# Patient Record
Sex: MALE | HISPANIC OR LATINO | Employment: UNEMPLOYED | URBAN - METROPOLITAN AREA
[De-identification: names, ages, dates, MRNs, and addresses within clinical notes are randomized per-mention and may not be internally consistent; named-entity substitution may affect disease eponyms.]

---

## 2017-10-16 ENCOUNTER — HOSPITAL ENCOUNTER (INPATIENT)
Facility: MEDICAL CENTER | Age: 47
LOS: 2 days | DRG: 392 | End: 2017-10-18
Attending: EMERGENCY MEDICINE | Admitting: INTERNAL MEDICINE

## 2017-10-16 ENCOUNTER — PATIENT OUTREACH (OUTPATIENT)
Dept: HEALTH INFORMATION MANAGEMENT | Facility: OTHER | Age: 47
End: 2017-10-16

## 2017-10-16 ENCOUNTER — RESOLUTE PROFESSIONAL BILLING HOSPITAL PROF FEE (OUTPATIENT)
Dept: HOSPITALIST | Facility: MEDICAL CENTER | Age: 47
End: 2017-10-16

## 2017-10-16 ENCOUNTER — APPOINTMENT (OUTPATIENT)
Dept: RADIOLOGY | Facility: MEDICAL CENTER | Age: 47
DRG: 392 | End: 2017-10-16
Attending: EMERGENCY MEDICINE

## 2017-10-16 DIAGNOSIS — R74.01 TRANSAMINITIS: ICD-10-CM

## 2017-10-16 DIAGNOSIS — R73.9 HYPERGLYCEMIA: ICD-10-CM

## 2017-10-16 DIAGNOSIS — R11.2 NON-INTRACTABLE VOMITING WITH NAUSEA, UNSPECIFIED VOMITING TYPE: ICD-10-CM

## 2017-10-16 DIAGNOSIS — R07.9 CHEST PAIN, UNSPECIFIED TYPE: ICD-10-CM

## 2017-10-16 PROBLEM — E11.9 TYPE 2 DIABETES MELLITUS (HCC): Status: ACTIVE | Noted: 2017-10-16

## 2017-10-16 PROBLEM — K70.10 ALCOHOLIC HEPATITIS: Status: ACTIVE | Noted: 2017-10-16

## 2017-10-16 PROBLEM — F10.20 ALCOHOL DEPENDENCE (HCC): Status: ACTIVE | Noted: 2017-10-16

## 2017-10-16 PROBLEM — K29.20 ALCOHOLIC GASTRITIS: Status: ACTIVE | Noted: 2017-10-16

## 2017-10-16 LAB
ALBUMIN SERPL BCP-MCNC: 4.6 G/DL (ref 3.2–4.9)
ALBUMIN/GLOB SERPL: 1.3 G/DL
ALP SERPL-CCNC: 94 U/L (ref 30–99)
ALT SERPL-CCNC: 60 U/L (ref 2–50)
ANION GAP SERPL CALC-SCNC: 10 MMOL/L (ref 0–11.9)
APTT PPP: 28.6 SEC (ref 24.7–36)
AST SERPL-CCNC: 139 U/L (ref 12–45)
BASOPHILS # BLD AUTO: 0.8 % (ref 0–1.8)
BASOPHILS # BLD: 0.05 K/UL (ref 0–0.12)
BILIRUB SERPL-MCNC: 3.2 MG/DL (ref 0.1–1.5)
BNP SERPL-MCNC: 12 PG/ML (ref 0–100)
BUN SERPL-MCNC: 6 MG/DL (ref 8–22)
CALCIUM SERPL-MCNC: 9.9 MG/DL (ref 8.5–10.5)
CHLORIDE SERPL-SCNC: 95 MMOL/L (ref 96–112)
CO2 SERPL-SCNC: 26 MMOL/L (ref 20–33)
CREAT SERPL-MCNC: 0.7 MG/DL (ref 0.5–1.4)
EKG IMPRESSION: NORMAL
EOSINOPHIL # BLD AUTO: 0.01 K/UL (ref 0–0.51)
EOSINOPHIL NFR BLD: 0.2 % (ref 0–6.9)
ERYTHROCYTE [DISTWIDTH] IN BLOOD BY AUTOMATED COUNT: 38.3 FL (ref 35.9–50)
GFR SERPL CREATININE-BSD FRML MDRD: >60 ML/MIN/1.73 M 2
GLOBULIN SER CALC-MCNC: 3.5 G/DL (ref 1.9–3.5)
GLUCOSE BLD-MCNC: 265 MG/DL (ref 65–99)
GLUCOSE BLD-MCNC: 285 MG/DL (ref 65–99)
GLUCOSE SERPL-MCNC: 240 MG/DL (ref 65–99)
HCT VFR BLD AUTO: 48 % (ref 42–52)
HGB BLD-MCNC: 17.8 G/DL (ref 14–18)
IMM GRANULOCYTES # BLD AUTO: 0.03 K/UL (ref 0–0.11)
IMM GRANULOCYTES NFR BLD AUTO: 0.5 % (ref 0–0.9)
INR PPP: 1.09 (ref 0.87–1.13)
LIPASE SERPL-CCNC: 62 U/L (ref 11–82)
LYMPHOCYTES # BLD AUTO: 1.29 K/UL (ref 1–4.8)
LYMPHOCYTES NFR BLD: 19.8 % (ref 22–41)
MCH RBC QN AUTO: 33.2 PG (ref 27–33)
MCHC RBC AUTO-ENTMCNC: 37.1 G/DL (ref 33.7–35.3)
MCV RBC AUTO: 89.6 FL (ref 81.4–97.8)
MONOCYTES # BLD AUTO: 0.49 K/UL (ref 0–0.85)
MONOCYTES NFR BLD AUTO: 7.5 % (ref 0–13.4)
NEUTROPHILS # BLD AUTO: 4.64 K/UL (ref 1.82–7.42)
NEUTROPHILS NFR BLD: 71.2 % (ref 44–72)
NRBC # BLD AUTO: 0 K/UL
NRBC BLD AUTO-RTO: 0 /100 WBC
PLATELET # BLD AUTO: 54 K/UL (ref 164–446)
PMV BLD AUTO: 12.9 FL (ref 9–12.9)
POTASSIUM SERPL-SCNC: 3.6 MMOL/L (ref 3.6–5.5)
PROT SERPL-MCNC: 8.1 G/DL (ref 6–8.2)
PROTHROMBIN TIME: 14.5 SEC (ref 12–14.6)
RBC # BLD AUTO: 5.36 M/UL (ref 4.7–6.1)
SODIUM SERPL-SCNC: 131 MMOL/L (ref 135–145)
TROPONIN I SERPL-MCNC: <0.01 NG/ML (ref 0–0.04)
WBC # BLD AUTO: 6.5 K/UL (ref 4.8–10.8)

## 2017-10-16 PROCEDURE — 80053 COMPREHEN METABOLIC PANEL: CPT

## 2017-10-16 PROCEDURE — 71010 DX-CHEST-LIMITED (1 VIEW): CPT | Performed by: EMERGENCY MEDICINE

## 2017-10-16 PROCEDURE — 700105 HCHG RX REV CODE 258: Performed by: INTERNAL MEDICINE

## 2017-10-16 PROCEDURE — 93005 ELECTROCARDIOGRAM TRACING: CPT | Performed by: EMERGENCY MEDICINE

## 2017-10-16 PROCEDURE — 700101 HCHG RX REV CODE 250: Performed by: INTERNAL MEDICINE

## 2017-10-16 PROCEDURE — 700102 HCHG RX REV CODE 250 W/ 637 OVERRIDE(OP): Performed by: INTERNAL MEDICINE

## 2017-10-16 PROCEDURE — 700111 HCHG RX REV CODE 636 W/ 250 OVERRIDE (IP): Performed by: INTERNAL MEDICINE

## 2017-10-16 PROCEDURE — A9270 NON-COVERED ITEM OR SERVICE: HCPCS | Performed by: INTERNAL MEDICINE

## 2017-10-16 PROCEDURE — 84484 ASSAY OF TROPONIN QUANT: CPT

## 2017-10-16 PROCEDURE — 83690 ASSAY OF LIPASE: CPT

## 2017-10-16 PROCEDURE — 85610 PROTHROMBIN TIME: CPT

## 2017-10-16 PROCEDURE — 99285 EMERGENCY DEPT VISIT HI MDM: CPT

## 2017-10-16 PROCEDURE — 93005 ELECTROCARDIOGRAM TRACING: CPT

## 2017-10-16 PROCEDURE — 85025 COMPLETE CBC W/AUTO DIFF WBC: CPT

## 2017-10-16 PROCEDURE — 99223 1ST HOSP IP/OBS HIGH 75: CPT | Performed by: INTERNAL MEDICINE

## 2017-10-16 PROCEDURE — 770020 HCHG ROOM/CARE - TELE (206)

## 2017-10-16 PROCEDURE — HZ2ZZZZ DETOXIFICATION SERVICES FOR SUBSTANCE ABUSE TREATMENT: ICD-10-PCS | Performed by: INTERNAL MEDICINE

## 2017-10-16 PROCEDURE — 71010 DX-CHEST-LIMITED (1 VIEW): CPT

## 2017-10-16 PROCEDURE — 700105 HCHG RX REV CODE 258: Performed by: EMERGENCY MEDICINE

## 2017-10-16 PROCEDURE — 85730 THROMBOPLASTIN TIME PARTIAL: CPT

## 2017-10-16 PROCEDURE — 83880 ASSAY OF NATRIURETIC PEPTIDE: CPT

## 2017-10-16 PROCEDURE — 82962 GLUCOSE BLOOD TEST: CPT

## 2017-10-16 RX ORDER — LORAZEPAM 2 MG/ML
0.5 INJECTION INTRAMUSCULAR EVERY 4 HOURS PRN
Status: DISCONTINUED | OUTPATIENT
Start: 2017-10-16 | End: 2017-10-18 | Stop reason: HOSPADM

## 2017-10-16 RX ORDER — OMEPRAZOLE 20 MG/1
20 CAPSULE, DELAYED RELEASE ORAL DAILY
Status: DISCONTINUED | OUTPATIENT
Start: 2017-10-16 | End: 2017-10-18 | Stop reason: HOSPADM

## 2017-10-16 RX ORDER — SODIUM CHLORIDE 9 MG/ML
1000 INJECTION, SOLUTION INTRAVENOUS ONCE
Status: COMPLETED | OUTPATIENT
Start: 2017-10-16 | End: 2017-10-16

## 2017-10-16 RX ORDER — LORAZEPAM 1 MG/1
2 TABLET ORAL
Status: DISCONTINUED | OUTPATIENT
Start: 2017-10-16 | End: 2017-10-18 | Stop reason: HOSPADM

## 2017-10-16 RX ORDER — ASPIRIN 300 MG/1
300 SUPPOSITORY RECTAL DAILY
Status: DISCONTINUED | OUTPATIENT
Start: 2017-10-16 | End: 2017-10-18 | Stop reason: HOSPADM

## 2017-10-16 RX ORDER — LORAZEPAM 1 MG/1
1 TABLET ORAL EVERY 4 HOURS PRN
Status: DISCONTINUED | OUTPATIENT
Start: 2017-10-16 | End: 2017-10-18 | Stop reason: HOSPADM

## 2017-10-16 RX ORDER — ASPIRIN 325 MG
325 TABLET ORAL DAILY
Status: DISCONTINUED | OUTPATIENT
Start: 2017-10-16 | End: 2017-10-18 | Stop reason: HOSPADM

## 2017-10-16 RX ORDER — ASPIRIN 81 MG/1
324 TABLET, CHEWABLE ORAL DAILY
Status: DISCONTINUED | OUTPATIENT
Start: 2017-10-16 | End: 2017-10-18 | Stop reason: HOSPADM

## 2017-10-16 RX ORDER — LORAZEPAM 0.5 MG/1
0.5 TABLET ORAL EVERY 4 HOURS PRN
Status: DISCONTINUED | OUTPATIENT
Start: 2017-10-16 | End: 2017-10-18 | Stop reason: HOSPADM

## 2017-10-16 RX ORDER — POLYETHYLENE GLYCOL 3350 17 G/17G
1 POWDER, FOR SOLUTION ORAL
Status: DISCONTINUED | OUTPATIENT
Start: 2017-10-16 | End: 2017-10-18 | Stop reason: HOSPADM

## 2017-10-16 RX ORDER — LORAZEPAM 2 MG/ML
1.5 INJECTION INTRAMUSCULAR
Status: DISCONTINUED | OUTPATIENT
Start: 2017-10-16 | End: 2017-10-18 | Stop reason: HOSPADM

## 2017-10-16 RX ORDER — THIAMINE MONONITRATE (VIT B1) 100 MG
100 TABLET ORAL DAILY
Status: DISCONTINUED | OUTPATIENT
Start: 2017-10-17 | End: 2017-10-18 | Stop reason: HOSPADM

## 2017-10-16 RX ORDER — AMOXICILLIN 250 MG
2 CAPSULE ORAL 2 TIMES DAILY
Status: DISCONTINUED | OUTPATIENT
Start: 2017-10-16 | End: 2017-10-18 | Stop reason: HOSPADM

## 2017-10-16 RX ORDER — BISACODYL 10 MG
10 SUPPOSITORY, RECTAL RECTAL
Status: DISCONTINUED | OUTPATIENT
Start: 2017-10-16 | End: 2017-10-18 | Stop reason: HOSPADM

## 2017-10-16 RX ORDER — LORAZEPAM 1 MG/1
4 TABLET ORAL
Status: DISCONTINUED | OUTPATIENT
Start: 2017-10-16 | End: 2017-10-18 | Stop reason: HOSPADM

## 2017-10-16 RX ORDER — LORAZEPAM 2 MG/ML
1 INJECTION INTRAMUSCULAR
Status: DISCONTINUED | OUTPATIENT
Start: 2017-10-16 | End: 2017-10-18 | Stop reason: HOSPADM

## 2017-10-16 RX ORDER — FOLIC ACID 1 MG/1
1 TABLET ORAL DAILY
Status: DISCONTINUED | OUTPATIENT
Start: 2017-10-17 | End: 2017-10-18 | Stop reason: HOSPADM

## 2017-10-16 RX ORDER — LORAZEPAM 2 MG/ML
2 INJECTION INTRAMUSCULAR
Status: DISCONTINUED | OUTPATIENT
Start: 2017-10-16 | End: 2017-10-18 | Stop reason: HOSPADM

## 2017-10-16 RX ORDER — HYDRALAZINE HYDROCHLORIDE 20 MG/ML
20 INJECTION INTRAMUSCULAR; INTRAVENOUS EVERY 6 HOURS PRN
Status: DISCONTINUED | OUTPATIENT
Start: 2017-10-16 | End: 2017-10-18 | Stop reason: HOSPADM

## 2017-10-16 RX ORDER — LORAZEPAM 1 MG/1
3 TABLET ORAL
Status: DISCONTINUED | OUTPATIENT
Start: 2017-10-16 | End: 2017-10-18 | Stop reason: HOSPADM

## 2017-10-16 RX ADMIN — HYDRALAZINE HYDROCHLORIDE 20 MG: 20 INJECTION INTRAMUSCULAR; INTRAVENOUS at 21:22

## 2017-10-16 RX ADMIN — POTASSIUM CHLORIDE: 2 INJECTION, SOLUTION, CONCENTRATE INTRAVENOUS at 22:07

## 2017-10-16 RX ADMIN — INSULIN LISPRO 5 UNITS: 100 INJECTION, SOLUTION INTRAVENOUS; SUBCUTANEOUS at 22:30

## 2017-10-16 RX ADMIN — ASPIRIN 325 MG: 325 TABLET, COATED ORAL at 21:23

## 2017-10-16 RX ADMIN — OMEPRAZOLE 20 MG: 20 CAPSULE, DELAYED RELEASE ORAL at 21:23

## 2017-10-16 RX ADMIN — SODIUM CHLORIDE 1000 ML: 9 INJECTION, SOLUTION INTRAVENOUS at 21:24

## 2017-10-16 ASSESSMENT — ENCOUNTER SYMPTOMS
ABDOMINAL PAIN: 0
NAUSEA: 1
FOCAL WEAKNESS: 0
DIZZINESS: 0
EYE PAIN: 0
COUGH: 0
FALLS: 0
INSOMNIA: 0
CHILLS: 0
HEADACHES: 0
EYE REDNESS: 0
SHORTNESS OF BREATH: 0
FEVER: 0
PALPITATIONS: 0
NERVOUS/ANXIOUS: 0
CONSTIPATION: 0
WHEEZING: 0
WEAKNESS: 0
BLOOD IN STOOL: 0
HEMOPTYSIS: 0
TREMORS: 0
MYALGIAS: 0
HEARTBURN: 1
VOMITING: 0
LOSS OF CONSCIOUSNESS: 0
DIARRHEA: 0
SEIZURES: 0

## 2017-10-16 ASSESSMENT — LIFESTYLE VARIABLES
DOES PATIENT WANT TO STOP DRINKING: NO
TREMOR: NO TREMOR
AVERAGE NUMBER OF DAYS PER WEEK YOU HAVE A DRINK CONTAINING ALCOHOL: 4
HAVE PEOPLE ANNOYED YOU BY CRITICIZING YOUR DRINKING: NO
EVER HAD A DRINK FIRST THING IN THE MORNING TO STEADY YOUR NERVES TO GET RID OF A HANGOVER: NO
NAUSEA AND VOMITING: MILD NAUSEA WITH NO VOMITING
VISUAL DISTURBANCES: NOT PRESENT
TOTAL SCORE: VERY MILD ITCHING, PINS AND NEEDLES SENSATION, BURNING OR NUMBNESS
ON A TYPICAL DAY WHEN YOU DRINK ALCOHOL HOW MANY DRINKS DO YOU HAVE: 3
HEADACHE, FULLNESS IN HEAD: NOT PRESENT
DO YOU DRINK ALCOHOL: YES
HOW MANY TIMES IN THE PAST YEAR HAVE YOU HAD 5 OR MORE DRINKS IN A DAY: 300
EVER FELT BAD OR GUILTY ABOUT YOUR DRINKING: YES
CONSUMPTION TOTAL: POSITIVE
AGITATION: NORMAL ACTIVITY
PAROXYSMAL SWEATS: NO SWEAT VISIBLE
TOTAL SCORE: 2
AUDITORY DISTURBANCES: NOT PRESENT
EVER_SMOKED: NEVER
HAVE YOU EVER FELT YOU SHOULD CUT DOWN ON YOUR DRINKING: YES
ORIENTATION AND CLOUDING OF SENSORIUM: ORIENTED AND CAN DO SERIAL ADDITIONS
TOTAL SCORE: 2
ANXIETY: NO ANXIETY (AT EASE)

## 2017-10-16 ASSESSMENT — PATIENT HEALTH QUESTIONNAIRE - PHQ9
SUM OF ALL RESPONSES TO PHQ QUESTIONS 1-9: 0
2. FEELING DOWN, DEPRESSED, IRRITABLE, OR HOPELESS: NOT AT ALL
SUM OF ALL RESPONSES TO PHQ9 QUESTIONS 1 AND 2: 0
1. LITTLE INTEREST OR PLEASURE IN DOING THINGS: NOT AT ALL

## 2017-10-16 NOTE — ED PROVIDER NOTES
"ED Provider Note    CHIEF COMPLAINT  Chief Complaint   Patient presents with   • Chest Pain     CP left sided x 2 days   • N/V   • Cold Symptoms       HPI  Pratik TAVAREZ is a 47 y.o. male who presents For evaluation of chest pain and vomiting.  The patient states that he's had pain of left sided chest over the last 2 days.  He has also had intermittent vomiting.  Patient states he's had slight cough.  Patient does have a history of alcohol and drug abuse.  The patient denies: Fever, chills, hemoptysis, hematemesis, melena hematochezia, abdominal pain, rashes, pain or swelling lower extremities, syncope.  He does state he gets quite dizzy when he stands up.  He indicates he recently ran out of his insulin.  No other acute symptomatology or complaints.    REVIEW OF SYSTEMS  See HPI for further details.  No history of: Thyroid dysfunction, seizures, heart disease, cancer, stroke.  All other systems negative.    PAST MEDICAL HISTORY  Past Medical History:   Diagnosis Date   • Alcohol abuse    • Drug abuse    Diabetes mellitus    FAMILY HISTORY  No family history on file.    SOCIAL HISTORY  Positive for alcohol and drug use;    SURGICAL HISTORY  Past Surgical History:   Procedure Laterality Date   • HERNIA REPAIR         CURRENT MEDICATIONS  See nurses notes    ALLERGIES  Allergies   Allergen Reactions   • Eggs        PHYSICAL EXAM  VITAL SIGNS: /104   Pulse 71   Temp 36.9 °C (98.4 °F)   Resp 16   Ht 1.651 m (5' 5\")   Wt 75.5 kg (166 lb 7.2 oz)   SpO2 96%   BMI 27.70 kg/m²    Constitutional: 47-year-old  male, Well developed, Well nourished, No acute distress, Non-toxic appearance.   HENT: ,Atraumatic, Bilateral external ears normal, tympanic membranes clear, Oropharynx mildly dry, No oral exudates, Nose normal.   Eyes: PERRL, EOMI, Conjunctiva normal, No discharge.   Neck: Normal range of motion, No tenderness, Supple, No stridor.   Lymphatic: No lymphadenopathy noted.   Cardiovascular: " Normal heart rate, Normal rhythm, No murmurs, No rubs, No gallops.   Thorax & Lungs: Normal Equal breath sounds, No respiratory distress, No wheezing, no stridor, no rales. No chest tenderness.   Abdomen: Soft, nontender, nondistended, no organomegaly, positive bowel sounds normal in quality. No guarding or rebound.  Skin: Good skin turgor, pink, warm, dry. No rashes, petechiae, purpura. Normal capillary refill.   Back: No tenderness, No CVA tenderness.   Extremities: Intact distal pulses, No edema, No tenderness, No cyanosis, No clubbing. Vascular: Pulses are 2+, symmetric in the upper and lower extremities.  Musculoskeletal: Good range of motion in all major joints. No tenderness to palpation or major deformities noted.   Neurologic: Alert & oriented x 3, Normal motor function, Normal sensory function, No gross focal deficits noted.   Psychiatric: Affect normal, Judgment normal, Mood normal.     EKG  I have interpreted: Rate 70, rhythm sinus, left axis deviation, CA QRS Q-T intervals normal, nonspecific ST-T wave changes, no STEMI, 12-lead EKG, no old tracing for comparison;    RADIOLOGY/PROCEDURES  DX-CHEST-LIMITED (1 VIEW)   Final Result      No evidence of acute cardiopulmonary process.            COURSE & MEDICAL DECISION MAKING  Pertinent Labs & Imaging studies reviewed. (See chart for details)  1.  IV normal saline    Laboratory studies: CBC shows white count 6.5, 71% neutrophils, 19% lymphocytes, 7% monocytes, hemoglobin 17.8, hematocrit 48.0; CMP shows sodium 131, chloride 95, glucose 240, BUN 6, , ALT 60, bilirubin 3.2, otherwise within normal, lipase 62; coags within normal; BNP 12; troponin less than 0.01;    Discussion/consultation: At this time, the patient presents for evaluation of chest pain and vomiting.  The patient has mild hyperglycemia but no signs of diabetic ketoacidosis.  Patient does have elevation in his LFTs.  His may be related to alcohol or drug use.  At this time, I spoke with  the hospitalist on-call.  The patient will be admitted for further monitoring, treatment, and care.    FINAL IMPRESSION  1. Chest pain, unspecified type    2. Non-intractable vomiting with nausea, unspecified vomiting type    3. Hyperglycemia    4. Transaminitis        PLAN  1.  The patient will be admitted for further monitoring, treatment, and care.    Electronically signed by: Guy G Gansert, 10/16/2017 4:37 PM

## 2017-10-16 NOTE — ED NOTES
Chief Complaint   Patient presents with   • Chest Pain     CP left sided x 2 days   • N/V   • Cold Symptoms     Pt also states he is a diabetic and he ran out of his insulin this morning. FSBS 265 in triage.   Pt placed back in lobby, educated on triage process, and told to inform staff of any change in condition.

## 2017-10-17 ENCOUNTER — APPOINTMENT (OUTPATIENT)
Dept: RADIOLOGY | Facility: MEDICAL CENTER | Age: 47
DRG: 392 | End: 2017-10-17
Attending: INTERNAL MEDICINE

## 2017-10-17 PROBLEM — R07.9 CHEST PAIN: Status: ACTIVE | Noted: 2017-10-17

## 2017-10-17 PROBLEM — E87.6 HYPOKALEMIA: Status: ACTIVE | Noted: 2017-10-17

## 2017-10-17 PROBLEM — R51.9 HEADACHE: Status: ACTIVE | Noted: 2017-10-17

## 2017-10-17 LAB
ALBUMIN SERPL BCP-MCNC: 3.9 G/DL (ref 3.2–4.9)
ALBUMIN/GLOB SERPL: 1.3 G/DL
ALP SERPL-CCNC: 75 U/L (ref 30–99)
ALT SERPL-CCNC: 57 U/L (ref 2–50)
ANION GAP SERPL CALC-SCNC: 7 MMOL/L (ref 0–11.9)
AST SERPL-CCNC: 115 U/L (ref 12–45)
BILIRUB SERPL-MCNC: 2.3 MG/DL (ref 0.1–1.5)
BUN SERPL-MCNC: 8 MG/DL (ref 8–22)
CALCIUM SERPL-MCNC: 8.9 MG/DL (ref 8.5–10.5)
CHLORIDE SERPL-SCNC: 99 MMOL/L (ref 96–112)
CHOLEST SERPL-MCNC: 218 MG/DL (ref 100–199)
CO2 SERPL-SCNC: 28 MMOL/L (ref 20–33)
CREAT SERPL-MCNC: 0.69 MG/DL (ref 0.5–1.4)
EKG IMPRESSION: NORMAL
EST. AVERAGE GLUCOSE BLD GHB EST-MCNC: 186 MG/DL
GFR SERPL CREATININE-BSD FRML MDRD: >60 ML/MIN/1.73 M 2
GLOBULIN SER CALC-MCNC: 2.9 G/DL (ref 1.9–3.5)
GLUCOSE BLD-MCNC: 181 MG/DL (ref 65–99)
GLUCOSE BLD-MCNC: 211 MG/DL (ref 65–99)
GLUCOSE BLD-MCNC: 245 MG/DL (ref 65–99)
GLUCOSE SERPL-MCNC: 214 MG/DL (ref 65–99)
HAV IGM SERPL QL IA: NEGATIVE
HBA1C MFR BLD: 8.1 % (ref 0–5.6)
HBV CORE IGM SER QL: NEGATIVE
HBV SURFACE AG SER QL: NEGATIVE
HCV AB SER QL: NEGATIVE
HDLC SERPL-MCNC: 36 MG/DL
LDLC SERPL CALC-MCNC: 118 MG/DL
MAGNESIUM SERPL-MCNC: 1.9 MG/DL (ref 1.5–2.5)
PHOSPHATE SERPL-MCNC: 2.6 MG/DL (ref 2.5–4.5)
POTASSIUM SERPL-SCNC: 3.4 MMOL/L (ref 3.6–5.5)
PROT SERPL-MCNC: 6.8 G/DL (ref 6–8.2)
SODIUM SERPL-SCNC: 134 MMOL/L (ref 135–145)
TRIGL SERPL-MCNC: 320 MG/DL (ref 0–149)
TROPONIN I SERPL-MCNC: <0.01 NG/ML (ref 0–0.04)
TROPONIN I SERPL-MCNC: <0.01 NG/ML (ref 0–0.04)

## 2017-10-17 PROCEDURE — 99232 SBSQ HOSP IP/OBS MODERATE 35: CPT | Performed by: INTERNAL MEDICINE

## 2017-10-17 PROCEDURE — 93010 ELECTROCARDIOGRAM REPORT: CPT | Performed by: INTERNAL MEDICINE

## 2017-10-17 PROCEDURE — 93005 ELECTROCARDIOGRAM TRACING: CPT | Performed by: INTERNAL MEDICINE

## 2017-10-17 PROCEDURE — 83036 HEMOGLOBIN GLYCOSYLATED A1C: CPT

## 2017-10-17 PROCEDURE — 700102 HCHG RX REV CODE 250 W/ 637 OVERRIDE(OP): Performed by: INTERNAL MEDICINE

## 2017-10-17 PROCEDURE — 84484 ASSAY OF TROPONIN QUANT: CPT | Mod: 91

## 2017-10-17 PROCEDURE — 700111 HCHG RX REV CODE 636 W/ 250 OVERRIDE (IP)

## 2017-10-17 PROCEDURE — 84100 ASSAY OF PHOSPHORUS: CPT

## 2017-10-17 PROCEDURE — 36415 COLL VENOUS BLD VENIPUNCTURE: CPT

## 2017-10-17 PROCEDURE — 80061 LIPID PANEL: CPT

## 2017-10-17 PROCEDURE — A9270 NON-COVERED ITEM OR SERVICE: HCPCS | Performed by: INTERNAL MEDICINE

## 2017-10-17 PROCEDURE — A9502 TC99M TETROFOSMIN: HCPCS

## 2017-10-17 PROCEDURE — 82962 GLUCOSE BLOOD TEST: CPT | Mod: 91

## 2017-10-17 PROCEDURE — 80074 ACUTE HEPATITIS PANEL: CPT

## 2017-10-17 PROCEDURE — 770020 HCHG ROOM/CARE - TELE (206)

## 2017-10-17 PROCEDURE — 83735 ASSAY OF MAGNESIUM: CPT

## 2017-10-17 PROCEDURE — 76705 ECHO EXAM OF ABDOMEN: CPT

## 2017-10-17 PROCEDURE — 80053 COMPREHEN METABOLIC PANEL: CPT

## 2017-10-17 RX ORDER — REGADENOSON 0.08 MG/ML
INJECTION, SOLUTION INTRAVENOUS
Status: COMPLETED
Start: 2017-10-17 | End: 2017-10-17

## 2017-10-17 RX ORDER — ACETAMINOPHEN 325 MG/1
650 TABLET ORAL EVERY 4 HOURS PRN
Status: DISCONTINUED | OUTPATIENT
Start: 2017-10-17 | End: 2017-10-18 | Stop reason: HOSPADM

## 2017-10-17 RX ORDER — POTASSIUM CHLORIDE 20 MEQ/1
20 TABLET, EXTENDED RELEASE ORAL 3 TIMES DAILY
Status: DISCONTINUED | OUTPATIENT
Start: 2017-10-17 | End: 2017-10-18 | Stop reason: HOSPADM

## 2017-10-17 RX ADMIN — INSULIN LISPRO 3 UNITS: 100 INJECTION, SOLUTION INTRAVENOUS; SUBCUTANEOUS at 11:43

## 2017-10-17 RX ADMIN — INSULIN LISPRO 3 UNITS: 100 INJECTION, SOLUTION INTRAVENOUS; SUBCUTANEOUS at 17:22

## 2017-10-17 RX ADMIN — REGADENOSON 0.4 MG: 0.08 INJECTION, SOLUTION INTRAVENOUS at 13:49

## 2017-10-17 RX ADMIN — POTASSIUM CHLORIDE 20 MEQ: 1500 TABLET, EXTENDED RELEASE ORAL at 14:33

## 2017-10-17 RX ADMIN — MAGNESIUM HYDROXIDE 30 ML: 400 SUSPENSION ORAL at 22:05

## 2017-10-17 RX ADMIN — POTASSIUM CHLORIDE 20 MEQ: 1500 TABLET, EXTENDED RELEASE ORAL at 10:23

## 2017-10-17 RX ADMIN — INSULIN LISPRO 2 UNITS: 100 INJECTION, SOLUTION INTRAVENOUS; SUBCUTANEOUS at 06:41

## 2017-10-17 RX ADMIN — POTASSIUM CHLORIDE 20 MEQ: 1500 TABLET, EXTENDED RELEASE ORAL at 22:03

## 2017-10-17 RX ADMIN — ACETAMINOPHEN 650 MG: 325 TABLET, FILM COATED ORAL at 10:21

## 2017-10-17 ASSESSMENT — LIFESTYLE VARIABLES
NAUSEA AND VOMITING: NO NAUSEA AND NO VOMITING
ANXIETY: NO ANXIETY (AT EASE)
PAROXYSMAL SWEATS: NO SWEAT VISIBLE
PAROXYSMAL SWEATS: NO SWEAT VISIBLE
ORIENTATION AND CLOUDING OF SENSORIUM: ORIENTED AND CAN DO SERIAL ADDITIONS
VISUAL DISTURBANCES: NOT PRESENT
AGITATION: NORMAL ACTIVITY
TOTAL SCORE: VERY MILD ITCHING, PINS AND NEEDLES SENSATION, BURNING OR NUMBNESS
HEADACHE, FULLNESS IN HEAD: NOT PRESENT
TOTAL SCORE: 3
AGITATION: NORMAL ACTIVITY
TOTAL SCORE: 1
AUDITORY DISTURBANCES: NOT PRESENT
TREMOR: NO TREMOR
AGITATION: NORMAL ACTIVITY
VISUAL DISTURBANCES: NOT PRESENT
PAROXYSMAL SWEATS: NO SWEAT VISIBLE
VISUAL DISTURBANCES: NOT PRESENT
HEADACHE, FULLNESS IN HEAD: MODERATE
NAUSEA AND VOMITING: NO NAUSEA AND NO VOMITING
AUDITORY DISTURBANCES: NOT PRESENT
TOTAL SCORE: 0
PAROXYSMAL SWEATS: NO SWEAT VISIBLE
AUDITORY DISTURBANCES: NOT PRESENT
ORIENTATION AND CLOUDING OF SENSORIUM: ORIENTED AND CAN DO SERIAL ADDITIONS
VISUAL DISTURBANCES: NOT PRESENT
HEADACHE, FULLNESS IN HEAD: NOT PRESENT
AUDITORY DISTURBANCES: NOT PRESENT
TOTAL SCORE: 0
TREMOR: NO TREMOR
AGITATION: NORMAL ACTIVITY
NAUSEA AND VOMITING: NO NAUSEA AND NO VOMITING
ORIENTATION AND CLOUDING OF SENSORIUM: ORIENTED AND CAN DO SERIAL ADDITIONS
ANXIETY: NO ANXIETY (AT EASE)
NAUSEA AND VOMITING: NO NAUSEA AND NO VOMITING
HEADACHE, FULLNESS IN HEAD: NOT PRESENT
AUDITORY DISTURBANCES: NOT PRESENT
PAROXYSMAL SWEATS: NO SWEAT VISIBLE
TREMOR: NO TREMOR
VISUAL DISTURBANCES: NOT PRESENT
AGITATION: NORMAL ACTIVITY
TOTAL SCORE: 0
SUBSTANCE_ABUSE: 1
ANXIETY: NO ANXIETY (AT EASE)
TREMOR: NO TREMOR
ANXIETY: NO ANXIETY (AT EASE)
HEADACHE, FULLNESS IN HEAD: NOT PRESENT
ORIENTATION AND CLOUDING OF SENSORIUM: ORIENTED AND CAN DO SERIAL ADDITIONS
ORIENTATION AND CLOUDING OF SENSORIUM: ORIENTED AND CAN DO SERIAL ADDITIONS
ANXIETY: NO ANXIETY (AT EASE)
TREMOR: NO TREMOR
NAUSEA AND VOMITING: NO NAUSEA AND NO VOMITING

## 2017-10-17 ASSESSMENT — ENCOUNTER SYMPTOMS
TREMORS: 1
HEADACHES: 1
VOMITING: 0
DEPRESSION: 1
SHORTNESS OF BREATH: 0
ABDOMINAL PAIN: 0
NAUSEA: 0
COUGH: 0

## 2017-10-17 ASSESSMENT — PAIN SCALES - GENERAL
PAINLEVEL_OUTOF10: 0
PAINLEVEL_OUTOF10: 0
PAINLEVEL_OUTOF10: 6

## 2017-10-17 NOTE — PROGRESS NOTES
Patient transferred from ED via gurney. Report received from Birgit.   Assumed care of patient.   Pt is a/o, safety check performed, all possessions and call bell within reach.   POC and doctors orders addressed as needed.

## 2017-10-17 NOTE — ASSESSMENT & PLAN NOTE
He has scleral icterus that is mild.  He has a transaminitis and elevated bilirubin.  Fatty liver on Sono  Serologies for hepatitis negative

## 2017-10-17 NOTE — ASSESSMENT & PLAN NOTE
Related to EtOH abuse  Checked Magnesium and Phosphorus> OK  Replace orally  Will also go down as sugars improve

## 2017-10-17 NOTE — H&P
Hospital Medicine History and Physical    Date of Service  10/16/2017    Chief Complaint  Chief Complaint   Patient presents with   • Chest Pain     CP left sided x 2 days   • N/V   • Cold Symptoms       History of Presenting Illness  47 y.o. male who presented 10/16/2017 withChest pain. Describes chest pain to be actually burning in quality. Starts epigastric region and radiating up to the chest and up to the neck. He rated as about a 6 out of 10, constant. It is not pleuritic, not reproducible by palpation. He has no cardiac or pulmonary history. He tells me that he has diabetes in the past but is not on insulin now probably due to noncompliance. He denies smoking. He however does admit to heavy drinking she says he drinks about 7 beers a day, his last drink was one beer last night. Admit to have a history of alcohol withdrawal but denies DTs or seizures. I asked him about the importance of detoxification and he agreed to get detoxed. He denies illicit drugs.  At the emergency room he is afebrile and hemodynamically stable if not slightly hypertensive. Chest x-ray showed no evidence of acute cardiopulmonary problems. EKG sinus. 2.0×1 is negative. The physician wanted to admit to CDU, for rule out MI.  Results are not emergency room he was in obvious distress. He looks flushed. Auscultation is clear. His partner was at bedside.   Primary Care Physician  Pcp Pt States None    Consultants      Code Status  Full    Review of Systems  Review of Systems   Constitutional: Negative for chills and fever.   HENT: Negative for congestion, hearing loss and nosebleeds.    Eyes: Negative for pain and redness.   Respiratory: Negative for cough, hemoptysis, shortness of breath and wheezing.    Cardiovascular: Positive for chest pain. Negative for palpitations.   Gastrointestinal: Positive for heartburn and nausea. Negative for abdominal pain, blood in stool, constipation, diarrhea and vomiting.   Genitourinary: Negative for  dysuria, frequency and hematuria.   Musculoskeletal: Negative for falls, joint pain and myalgias.   Skin: Negative for rash.   Neurological: Negative for dizziness, tremors, focal weakness, seizures, loss of consciousness, weakness and headaches.   Psychiatric/Behavioral: The patient is not nervous/anxious and does not have insomnia.    All other systems reviewed and are negative.       Past Medical History  Past Medical History:   Diagnosis Date   • Alcohol abuse    • Drug abuse        Surgical History  Past Surgical History:   Procedure Laterality Date   • HERNIA REPAIR         Medications  No current facility-administered medications on file prior to encounter.      Current Outpatient Prescriptions on File Prior to Encounter   Medication Sig Dispense Refill   • oxycodone, immediate release, (ROXICODONE) 5 MG TABS Take 1-2 Tabs by mouth every 6 hours as needed for Mild Pain (moderate pain). 20 Each 0       Family History  No family history on file.    Social History  Social History   Substance Use Topics   • Smoking status: Never Smoker   • Smokeless tobacco: Not on file   • Alcohol use Yes      Comment: daily       Allergies  Allergies   Allergen Reactions   • Eggs         Physical Exam  Laboratory   Hemodynamics  Temp (24hrs), Av.9 °C (98.4 °F), Min:36.9 °C (98.4 °F), Max:36.9 °C (98.4 °F)   Temperature: 36.9 °C (98.4 °F)  Pulse  Av  Min: 71  Max: 71    Blood Pressure: 157/104      Respiratory      Respiration: 16, Pulse Oximetry: 96 %             Physical Exam   Constitutional: He appears well-developed and well-nourished.   HENT:   Head: Normocephalic and atraumatic.   Eyes: EOM are normal. Scleral icterus (mild) is present.   Neck: Normal range of motion. Neck supple.   Cardiovascular: Normal rate and regular rhythm.  Exam reveals no gallop and no friction rub.    No murmur heard.  Pulmonary/Chest: Effort normal and breath sounds normal. No respiratory distress. He has no wheezes. He has no rales.    Abdominal: Soft. Bowel sounds are normal. He exhibits no distension. There is no tenderness. There is no rebound and no guarding.   Musculoskeletal: He exhibits no edema or tenderness.   Neurological: He is alert.   Skin: Skin is warm.   Mild jaundice   Psychiatric: He has a normal mood and affect. His behavior is normal.       Recent Labs      10/16/17   1423   WBC  6.5   RBC  5.36   HEMOGLOBIN  17.8   HEMATOCRIT  48.0   MCV  89.6   MCH  33.2*   MCHC  37.1*   RDW  38.3   PLATELETCT  54*   MPV  12.9     Recent Labs      10/16/17   1423   SODIUM  131*   POTASSIUM  3.6   CHLORIDE  95*   CO2  26   GLUCOSE  240*   BUN  6*   CREATININE  0.70   CALCIUM  9.9     Recent Labs      10/16/17   1423   ALTSGPT  60*   ASTSGOT  139*   ALKPHOSPHAT  94   TBILIRUBIN  3.2*   LIPASE  62   GLUCOSE  240*     Recent Labs      10/16/17   1423   APTT  28.6   INR  1.09     Recent Labs      10/16/17   1423   BNPBTYPENAT  12         Lab Results   Component Value Date    TROPONINI <0.01 10/16/2017     Urinalysis:    Lab Results  Component Value Date/Time   SPECGRAVITY 1.015 03/15/2012 0440   GLUCOSEUR Trace (A) 03/15/2012 0440   KETONES 40 (A) 03/15/2012 0440   NITRITE Negative 03/15/2012 0440   WBCURINE 0-2 (A) 03/15/2012 0440        Imaging  Dx-chest-limited (1 View)    Result Date: 10/16/2017  10/16/2017 3:55 PM HISTORY/REASON FOR EXAM: Left-sided chest pain TECHNIQUE/EXAM DESCRIPTION AND NUMBER OF VIEWS: Single AP view of the chest. COMPARISON: None FINDINGS: There is no evidence of focal infiltrate or pulmonary edema. The heart is normal in size. There is no pleural effusion. Bony structures and soft tissues are unremarkable.     No evidence of acute cardiopulmonary process.     Assessment/Plan     I anticipate this patient will require at least two midnights for appropriate medical management, necessitating inpatient admission.    * Alcoholic gastritis   Assessment & Plan    He presented with chest pain she describes as burning as well  as epigastric pain.  He usually drinks about 7 beers a day although this may be underestimated. His last drink was one beer last night.  Give PPI for acid reflux symptoms.   ER physician wanted to admit for chest pain rule out MI. At this point, Telemetry, trend troponins, stress test if troponins are negative, ASA, ordered lipid panel. Because of his hepatitis would avoid starting him on a statin.          Thrombocytopenia (CMS-HCC)- (present on admission)   Assessment & Plan    Likely related to alcohol. Currently not bleeding. Trend platelets.        Alcohol withdrawal (CMS-HCC)- (present on admission)   Assessment & Plan    He is high risk for withdrawal. We will do one withdrawal protocol. Start banana bag. Thiamine. I discussed it thoroughly with him and we will treat this all depends and alcoholic hepatitis 1st. Although cessation encouraged.        Type 2 diabetes mellitus (CMS-HCC)   Assessment & Plan    He knows that he has a diagnosis of diabetes mellitus noncompliant. He is hyperglycemic at the emergency room. Obtain A1c and put him on sliding scale.        Alcoholic hepatitis   Assessment & Plan    He has scleral icterus that is mild.  He has a transaminitis and elevated bilirubin.  Admits to alcohol use  Obtain a liver or gallbladder ultrasound. We get a viral hepatitis panel. He will need follow-up with his primary care physician and GI referral at some point. I did explain to him that as long as he is drinking alcohol, could be difficult to give him pain medications or any other medications that are hepatically cleared.          Alcohol dependence (CMS-HCC)   Assessment & Plan    He has a history of alcohol dependence and withdrawal. He is a heavy drinker. Encourage alcohol cessation.            VTE prophylaxis:SCDs.    I spent 80 minutes, reviewing the chart, notes, vitals, labs, imaging, ordering labs, evaluating Pratik TAVAREZ for assessment, enacting the plan above. 50% of the time was  spent in counseling Pratik TAVAREZ and this partner, answering questions. Discussed with ED physician. Medical decision making is therefore complex. Time was devoted to counseling and coordinating care including review of records, pertinent lab data and studies, as well as discussing diagnostic evaluation and work up, planned therapeutic interventions and future disposition of care. Where indicated, the assessment and plan reflect discussion of patient with consultants, other healthcare providers, family members, and additional research needed to obtain further information in formulating the plan of care for Pratik TAVAREZ.   Sections of this note have been dictated using Dragon Speech recognition software. While care and attention has been placed to ensure the accuracy of this note, there can be occasional typographical errors that may be missed and I apologize if this situation occurs. Please take these errors into context. If questions occur please do not hesitate to call or notify the author of this note for clarification.

## 2017-10-17 NOTE — ED NOTES
Pateint transported to telemetry floor via gurney accompanied by ED RN and family with cardiac monitor in place. All belongings accounted for.

## 2017-10-17 NOTE — ASSESSMENT & PLAN NOTE
Cessation discussed w/ patient (longest abstinence 2 years)  Consequences of ongoing use discussed- he will not survive to become old

## 2017-10-17 NOTE — PROGRESS NOTES
Patient off unit to stress test    Patient returned to room T725, vitals stable and patient oriented.

## 2017-10-17 NOTE — PROGRESS NOTES
Patient complaining of 6/10 headache and blurry vision in his right eye. Dr. Teixeira notified, ordered tylenol

## 2017-10-18 VITALS
RESPIRATION RATE: 22 BRPM | HEIGHT: 65 IN | WEIGHT: 164.68 LBS | TEMPERATURE: 97.6 F | HEART RATE: 65 BPM | OXYGEN SATURATION: 97 % | DIASTOLIC BLOOD PRESSURE: 84 MMHG | SYSTOLIC BLOOD PRESSURE: 128 MMHG | BODY MASS INDEX: 27.44 KG/M2

## 2017-10-18 LAB
ANION GAP SERPL CALC-SCNC: 7 MMOL/L (ref 0–11.9)
BUN SERPL-MCNC: 7 MG/DL (ref 8–22)
CALCIUM SERPL-MCNC: 9.1 MG/DL (ref 8.5–10.5)
CHLORIDE SERPL-SCNC: 101 MMOL/L (ref 96–112)
CO2 SERPL-SCNC: 27 MMOL/L (ref 20–33)
CREAT SERPL-MCNC: 0.7 MG/DL (ref 0.5–1.4)
EKG IMPRESSION: NORMAL
GFR SERPL CREATININE-BSD FRML MDRD: >60 ML/MIN/1.73 M 2
GLUCOSE BLD-MCNC: 137 MG/DL (ref 65–99)
GLUCOSE BLD-MCNC: 154 MG/DL (ref 65–99)
GLUCOSE BLD-MCNC: 214 MG/DL (ref 65–99)
GLUCOSE SERPL-MCNC: 139 MG/DL (ref 65–99)
POTASSIUM SERPL-SCNC: 3.7 MMOL/L (ref 3.6–5.5)
SODIUM SERPL-SCNC: 135 MMOL/L (ref 135–145)

## 2017-10-18 PROCEDURE — 80048 BASIC METABOLIC PNL TOTAL CA: CPT

## 2017-10-18 PROCEDURE — 99239 HOSP IP/OBS DSCHRG MGMT >30: CPT | Performed by: INTERNAL MEDICINE

## 2017-10-18 PROCEDURE — A9270 NON-COVERED ITEM OR SERVICE: HCPCS | Performed by: INTERNAL MEDICINE

## 2017-10-18 PROCEDURE — 700102 HCHG RX REV CODE 250 W/ 637 OVERRIDE(OP): Performed by: INTERNAL MEDICINE

## 2017-10-18 PROCEDURE — 93010 ELECTROCARDIOGRAM REPORT: CPT | Performed by: INTERNAL MEDICINE

## 2017-10-18 PROCEDURE — 82962 GLUCOSE BLOOD TEST: CPT | Mod: 91

## 2017-10-18 PROCEDURE — 36415 COLL VENOUS BLD VENIPUNCTURE: CPT

## 2017-10-18 PROCEDURE — 93005 ELECTROCARDIOGRAM TRACING: CPT | Performed by: INTERNAL MEDICINE

## 2017-10-18 RX ORDER — POTASSIUM CHLORIDE 20 MEQ/1
20 TABLET, EXTENDED RELEASE ORAL DAILY
Qty: 30 TAB | Refills: 1 | Status: SHIPPED | OUTPATIENT
Start: 2017-10-18

## 2017-10-18 RX ORDER — POTASSIUM CHLORIDE 20 MEQ/1
20 TABLET, EXTENDED RELEASE ORAL DAILY
Qty: 30 TAB | Refills: 1 | Status: SHIPPED | OUTPATIENT
Start: 2017-10-18 | End: 2017-10-18

## 2017-10-18 RX ADMIN — POTASSIUM CHLORIDE 20 MEQ: 1500 TABLET, EXTENDED RELEASE ORAL at 07:52

## 2017-10-18 RX ADMIN — FOLIC ACID 1 MG: 1 TABLET ORAL at 07:53

## 2017-10-18 RX ADMIN — OMEPRAZOLE 20 MG: 20 CAPSULE, DELAYED RELEASE ORAL at 07:53

## 2017-10-18 RX ADMIN — THIAMINE HCL TAB 100 MG 100 MG: 100 TAB at 08:01

## 2017-10-18 RX ADMIN — THERA TABS 1 TABLET: TAB at 07:53

## 2017-10-18 RX ADMIN — INSULIN LISPRO 3 UNITS: 100 INJECTION, SOLUTION INTRAVENOUS; SUBCUTANEOUS at 11:59

## 2017-10-18 RX ADMIN — STANDARDIZED SENNA CONCENTRATE AND DOCUSATE SODIUM 2 TABLET: 8.6; 5 TABLET, FILM COATED ORAL at 07:52

## 2017-10-18 RX ADMIN — ASPIRIN 325 MG: 325 TABLET, COATED ORAL at 07:53

## 2017-10-18 ASSESSMENT — LIFESTYLE VARIABLES
VISUAL DISTURBANCES: NOT PRESENT
ORIENTATION AND CLOUDING OF SENSORIUM: ORIENTED AND CAN DO SERIAL ADDITIONS
NAUSEA AND VOMITING: NO NAUSEA AND NO VOMITING
AUDITORY DISTURBANCES: NOT PRESENT
TOTAL SCORE: 0
HEADACHE, FULLNESS IN HEAD: NOT PRESENT
ANXIETY: NO ANXIETY (AT EASE)
AGITATION: NORMAL ACTIVITY
TREMOR: NO TREMOR
PAROXYSMAL SWEATS: NO SWEAT VISIBLE

## 2017-10-18 ASSESSMENT — PAIN SCALES - GENERAL
PAINLEVEL_OUTOF10: 4
PAINLEVEL_OUTOF10: 0
PAINLEVEL_OUTOF10: 0

## 2017-10-18 NOTE — DISCHARGE PLANNING
Care Transition Team Assessment    Pt uninsured and concerned about being able to afford rx's. Called [facility] to check pt's out of pocket costs. Pharmacists stated total cost for both medications would be $24.50. Information relayed to pt and he stated he is able to afford this.      Information Source  Orientation : Oriented x 4  Information Given By: Patient         Elopement Risk  Legal Hold: No  Ambulatory or Self Mobile in Wheelchair: Yes  Disoriented: No  Psychiatric Symptoms: None  History of Wandering: No  Elopement this Admit: No  Vocalizing Wanting to Leave: No  Displays Behaviors, Body Language Wanting to Leave: No-Not at Risk for Elopement  Elopement Risk: Not at Risk for Elopement    Interdisciplinary Discharge Planning  Does Admitting Nurse Feel This Could be a Complex Discharge?: No  Patient or legal guardian wants to designate a caregiver (see row info): No    Discharge Preparedness  What is your plan after discharge?: Home with help  Prior Functional Level: Ambulatory, Independent with Activities of Daily Living, Independent with Medication Management  Difficulity with ADLs: None  Difficulity with IADLs: None    Functional Assesment  Prior Functional Level: Ambulatory, Independent with Activities of Daily Living, Independent with Medication Management    Finances  Financial Barriers to Discharge: Yes  Prescription Coverage: No    Vision / Hearing Impairment  Vision Impairment : No  Hearing Impairment : No    Values / Beliefs / Concerns  Values / Beliefs Concerns : No         Domestic Abuse  Have you ever been the victim of abuse or violence?: No  Physical Abuse or Sexual Abuse: No  Verbal Abuse or Emotional Abuse: No  Possible Abuse Reported to:: Not Applicable    Psychological Assessment  History of Substance Abuse: Alcohol    Discharge Risks or Barriers  Discharge risks or barriers?: Uninsured / underinsured, Substance abuse, Non-adherence to medication or treatment  Patient risk factors:  Substance abuse, Uninsured or underinsured    Anticipated Discharge Information  Anticipated discharge disposition: Home

## 2017-10-18 NOTE — DISCHARGE INSTRUCTIONS
Discharge Instructions    Discharged to home by car with self. Discharged via wheelchair, hospital escort: Yes.  Special equipment needed: Not Applicable    Be sure to schedule a follow-up appointment with your primary care doctor or any specialists as instructed.     Discharge Plan:   Influenza Vaccine Indication: Patient Refuses    I understand that a diet low in cholesterol, fat, and sodium is recommended for good health. Unless I have been given specific instructions below for another diet, I accept this instruction as my diet prescription.   Other diet: diabetic      Special Instructions: None    · Is patient discharged on Warfarin / Coumadin?   No     · Is patient Post Blood Transfusion?  No    Depression / Suicide Risk    As you are discharged from this [facility] facility, it is important to learn how to keep safe from harming yourself.    Recognize the warning signs:  · Abrupt changes in personality, positive or negative- including increase in energy   · Giving away possessions  · Change in eating patterns- significant weight changes-  positive or negative  · Change in sleeping patterns- unable to sleep or sleeping all the time   · Unwillingness or inability to communicate  · Depression  · Unusual sadness, discouragement and loneliness  · Talk of wanting to die  · Neglect of personal appearance   · Rebelliousness- reckless behavior  · Withdrawal from people/activities they love  · Confusion- inability to concentrate     If you or a loved one observes any of these behaviors or has concerns about self-harm, here's what you can do:  · Talk about it- your feelings and reasons for harming yourself  · Remove any means that you might use to hurt yourself (examples: pills, rope, extension cords, firearm)  · Get professional help from the community (Mental Health, Substance Abuse, psychological counseling)  · Do not be alone:Call your Safe Contact- someone whom you trust who will be there for you.  · Call your  local CRISIS HOTLINE 678-5080 or [phone]  · Call your local Children's Mobile Crisis Response Team [place] [phone]) 766-7070 or www.Biomatrica  · Call the toll free National Suicide Prevention Hotlines   · National Suicide Prevention Lifeline 304-792-EWVK (1908)  · National Known Line Network 800-SUICIDE (219-9901)        Diabetes y normas básicas de atención médica  (Diabetes and Standards of Medical Care)  La diabetes es janeth enfermedad complicada. El equipo que trate webster diabetes deberá incluir un nutricionista, un enfermero, un educador para la diabetes, un oftalmólogo y más. Para que todas las personas conozcan sobre webster enfermedad y para que los pacientes tengan los cuidados que necesitan, se crearon las siguientes normas básicas para un mejor control. A continuación se indican los estudios, vacunas, medicamentos, educación y planes que necesitará.  Prueba de HbA1c  Esta prueba muestra cómo ha sido controlada webster glucosa en los últimos 2 o 3 meses. Se utiliza para verificar si el plan de control de la diabetes debe ser ajustado.   · Hágalos al menos 2 veces al año si cumple los objetivos del tratamiento.  · Si le amezquita cambiado el tratamiento o si no cumple con los objetivos del tratamiento, debe hacerlo 4 veces al año.  Control de la presión arterial.  · Hágalas en cada visita médica de rutina. El objetivo es tener menos de 140/90 mm Hg en la mayoría de las personas, melissa 130/80 mm Hg en algunos casos. Consulte a webster médico acerca de webster objetivo.  Examen dental.  · Concurra regularmente a las visitas de control con el dentista.  Examen ocular.  · Si le diagnosticaron diabetes tipo 1 siendo un phyllis, debe hacerse estudios al llegar a los 10 años o más y si ha sufrido de diabetes cseilia 3 a 5 años. Se recomienda hacer anualmente los exámenes oculares después de clayton examen inicial.  · Si le diagnosticaron diabetes tipo 1 siendo adulto, hágase un examen dentro de los 5 años del diagnóstico y luego janeth vez  por año.  · Si le diagnosticaron diabetes tipo 2, hágase un estudio lo antes posible después del diagnóstico y luego janeth vez por año.  Examen de los pies  · Se hará janeth inspección visual en cada visita médica de rutina. Estos controles observarán si hay nogueira, lesiones u otros problemas en los pies.  · Debe realizarse un examen completo de los pies cada año. [place] incluye revisar la estructura y la piel de los pies, y examinar los pulsos y la sensación de los pies.  ¨ Diabetes tipo 1: La primera prueba se realiza 5 años después del diagnóstico.  ¨ Diabetes tipo 2: La primera prueba se realiza en el momento del diagnóstico.  · Contrólese los pies todas las noches para sarah si hay nogueira, lesiones u otros problemas. Comuníquese con webster médico si observa que no se curan.  Estudio de la función renal (microalbúmina en orina)  · Debe realizarse janeth vez por año.  ¨ Diabetes tipo 1: La primera prueba se realiza a los 5 años después del diagnóstico.  ¨ Diabetes tipo 2: La primera prueba se realiza en el momento del diagnóstico.  · La creatinina sérica y el índice de filtración glomerular estimada (eGFR, por tobias siglas en inglés) se realizan janeth vez por año para informar el nivel de enfermedad renal crónica, si la hubiera.  Perfil lipídico (colesterol, HDL, LDL, triglicéridos).  · La mayoría de las personas lo hacen cada 5 años.  ¨ En relación al LDL, el objetivo es tener menos de 100 mg/dl. Si tiene alto riesgo, el objetivo es tener menos de 70 mg/dl.  ¨ En relación al HDL, el objetivo es tener entre 40 y 50 mg/dl para los hombres y entre 50 y 60 mg/dl para las mujeres. Un nivel de colesterol HDL de 60 mg/dl o superior da janeth cierta protección contra la enfermedad cardíaca.  ¨ En relación a los triglicéridos, el objetivo es tener menos de 150 mg/dl.  Vacunas  · Se recomienda aplicar de forma anual la vacuna contra la gripe a todas las personas de 6 meses en adelante que tengan diabetes.  · La vacuna contra la neumonía  (antineumocócica) está recomendada para todas las personas de 2 años en adelante que tengan diabetes. Los adultos de 65 años o más pueden recibir la vacuna antineumocócica lars janeth serie de dos inyecciones diferentes.  · Se recomienda administrar la vacuna contra la hepatitis B en adultos poco después de que hayan recibido el diagnóstico de diabetes.  · La vacuna Tdap (contra el tétanos, la difteria y la tosferina) debe aplicarse de la siguiente manera:  ¨ Según las pautas normales de vacunación infantil en el jose m de los niños.  ¨ Cada 10 años en el jose m de los adultos con diabetes.  Educación para el autocontrol de la diabetes  · Recomendaciones al momento del diagnóstico y los controles según sea necesario.  Plan de tratamiento  · Webster plan de tratamiento será revisado en cada visita médica.     Esta información no tiene lars fin reemplazar el consejo del médico. Asegúrese de hacerle al médico cualquier pregunta que tenga.     Document Released: 03/14/2011 Document Revised: 01/08/2016  [employer]armac[employer]ae Interactive Patient Education ©2016 Elsevier Inc.        Metformin tablets  ¿Qué es aysha medicamento?  La METFORMINA se usa para tratar la diabetes tipo 2. Ayuda a controlar el nivel de azúcar en la lilly. El tratamiento se combina con ejercicios y janeth dieta. Aysha medicamento se puede usar solo o con otros medicamentos para la diabetes, incluyendo la insulina.  Aysha medicamento puede ser utilizado para otros usos; si tiene alguna pregunta consulte con webster proveedor de atención médica o con webster farmacéutico.  MARCAS COMERCIALES DISPONIBLES: Glucophage  ¿Qué le huy informar a mi profesional de la jose eduardo antes de kelvin aysha medicamento?  Necesita saber si usted presenta alguno de los siguientes problemas o situaciones:  -anemia  -si consume bebidas alcohólicas con frecuencia  -se deshidrata con facilidad  -ataque cardiaco  -insuficiencia cardiaca tratada con medicamentos  -enfermedad renal  -enfermedad hepática  -ovarios  poliquísticos  -infección o lesión severa  -vómito  -janeth reacción alérgica o inusual a la metformina, a otros medicamentos, alimentos, colorantes o conservantes  -si está embarazada o buscando quedar embarazada  -si está amamantando a un bebé  ¿Cómo huy utilizar aysha medicamento?  [place] aysha medicamento por vía oral. Tómelo con las comidas. Ingiera las tabletas con un vaso de agua. Siga las instrucciones de la etiqueta del medicamento. [place] tobias dosis a intervalos regulares. No tome webster medicamento con janeth frecuencia mayor a la indicada.  Hable con webster pediatra para informarse acerca del uso de aysha medicamento en niños. Aunque aysha medicamento ha sido recetado a niños tan menores lars de 10 años de edad para condiciones selectivas, las precauciones se aplican.  Sobredosis: Póngase en contacto inmediatamente con un centro toxicológico o janeth maryuri de urgencia si usted nargis que haya tomado demasiado medicamento.  ATENCIÓN: Aysha medicamento es solo para usted. No comparta aysha medicamento con nadie.  ¿Qué sucede si me olvido de janeth dosis?  Si olvida janeth dosis, tómela lo antes posible. Si es bo la hora de webster dosis siguiente, tome sólo deon dosis. No tome dosis adicionales o dobles.  ¿Qué puede interactuar con aysha medicamento?  No tome esta medicina con ninguno de los siguientes medicamentos:  -dofetilida  -gatifloxacino  -ciertos agentes de contraste administrados antes de un procedimiento con rashad X, tomografías computadas (CT), MRI u otros procedimientos  Muchos medicamentos pueden aumentar o reducir el nivel de azúcar en la lilly, tales lars:  -digoxina  -diuréticos  -hormonas femeninas, lars estrógenos, progestinas o píldoras anticonceptivas  -isoniazida  -medicamentos para presión sanguínea, enfermedad cardiaca, pulso cardiaco irregular  -morfina  -ácido nicotínico  -fenotiazinas, tales lars clorpromacina, mesoridazina, proclorperazina,  tioridazina  -fenitoína  -procainamida  -quinidina  -quinina  -ranitidina  -medicamentos esteroideos, lars la prednisona o la cortisona  -medicamentos estimulantes para trastornos de atención, perder peso o mantenerse despierto  -medicamentos tiroideos  -trimetoprima  -vancomicina  Puede ser que esta lista no menciona todas las posibles interacciones. Informe a webster profesional de la jose eduardo de todos los productos a base de hierbas, medicamentos de venta luis felipe o suplementos nutritivos que esté tomando. Si usted fuma, consume bebidas alcohólicas o si utiliza drogas ilegales, indíqueselo también a webster profesional de la jose eduardo. Algunas sustancias pueden interactuar con webster medicamento.  ¿A qué huy estar atento al usar aysha medicamento?  Visite a webster médico o a webster profesional de la jose eduardo para chequear webster evolución periódicamente.  Aprenda cómo controlar el nivel de azúcar en la lilly. Aprenda a reconocer tobias síntomas de bajo y alto nivel de azúcar en la lilly y cómo tratarlos.  Si tiene bajo nivel de azúcar en la lilly, coma o drew algo que contenga azúcar. Asegúrese de que otros sepan que deben obtener ayuda médica inmediatamente si desarrolla síntomas graves de bajo nivel de azúcar en la lilly, tales lars convulsiones o pérdida del conocimiento.  Si va a someterse a janeth operación o a un procedimiento con rashad X en que se utilicen agentes de contraste, informe a webster médico o a webster profesional de la jose eduardo que está utilizando aysha medicamento.  Use janeth pulsera o parham de identificación médica que indique que tiene diabetes y lleve janeth tarjeta que indique todos tobias medicamentos.  ¿Qué efectos secundarios puedo tener al utilizar aysha medicamento?  Efectos secundarios que debe informar a webster médico o a webster profesional de la jose eduardo tan pronto lars sea posible:  -reacciones alérgicas lars erupción cutánea, picazón o urticarias, hinchazón de la avi, labios o lengua  -problemas respiratorios  -sensación de desmayos o mareos,  caídas  -bajo nivel de glucosa en la lilly (pregunte a webster médico o webster profesional de la jose eduardo por janeth lista de estos síntomas)  -lety o molestias musculares  -pulso cardiaco irregular o lento  -molestias o dolor de estómago inusual  -cansancio o debilidad inusual  Efectos secundarios que, por lo general, no requieren atención médica (debe informarlos a webster médico o a webster profesional de la jose eduardo si persisten o si son molestos):  -diarrea  -dolor de greg  -acidez de estómago  -sabor metálico en la boca  -náuseas  -molestias estomacales, gases  Puede ser que esta lista no menciona todos los posibles efectos secundarios. Comuníquese a webster médico por asesoramiento médico sobre los efectos secundarios. Usted puede informar los efectos secundarios a la FDA por teléfono al [phone].  ¿Dónde huy guardar mi medicina?  Manténgala fuera del alcance de los niños.  Guárdela a temperatura ambiente, entre 15 y 30 grados C (59 y 86 grados F). Protéjala de la humedad y chris. Deseche todo el medicamento que no haya utilizado, después de la fecha de vencimiento.  ATENCIÓN: Nela folleto es un resumen. Puede ser que no cubra toda la posible información. Si usted tiene preguntas acerca de esta medicina, consulte con webster médico, webster farmacéutico o webster profesional de la jose eduardo.  © 2014, Elsevier/Gold Standard. (10/27/2008 10:04:00 Pm)      Problemas Con El Alcohol  (Alcohol Problems)  La mayoría de los adultos que beben alcohol lo hacen con moderación (no demasiado) y poseen bajo riesgo de tener problemas relacionados con la bebida. Sin embargo, todos los bebedores, inclusive los de bajo riesgo, deberían conocer los riesgos de la jose eduardo que conlleva la ingesta de alcohol.  RECOMENDACIONES PARA LOS BEBEDORES DE BAJO RIESGO  Beber con moderación. La ingesta moderada de alcohol se establece de la siguiente manera:   · Hombres  no más de dos tragos por día.  · Mujeres  no más de un trago por día.  · Mayores de 65 años  no más de un  trago por día.  Un trago estándar es 12 gramos de alcohol dipak, lo que es lo mismo que janeth botella de 12 onzas de cerveza o clericó, un vaso de vino de 5 onzas, o 1 onza y media de bebidas gavi (anna whisky, tate, vodka, o michelle).   ABSTÉNGASE (NO AINSLEY) ALCOHOL:  · Si está embarazada o contempla la posibilidad.  · Cuando tome un medicamento que interactúa con el alcohol.  · Si usted es dependiente del alcohol.  · Sufre alguna enfermedad en la que se prohíba el consumo de alcohol (anna úlceras, enfermedades hepáticas, o enfermedades cardíacas).  HABLE CON EL MÉDICO:  · Si tiene riesgo de sufrir janeth enfermedad coronaria, converse sobre los potenciales beneficios y riesgos de la ingesta de alcohol: La ingesta baja a moderada de alcohol está asociada con tasas menores de enfermedades coronarias en ciertas poblaciones (por ejemplo, hombres mayores de 45 años y mujeres postmenopáusicas). Se aconseja a las personas no bebedoras o abstemias no comenzar con la ingesta baja a moderada para reducir el riesgo de enfermedades coronarias en función de evitar la aparición de un problema relacionado con el alcohol. Pueden obtenerse efectos protectores similares a través de janeth dieta y ejercicios adecuados.  · Las mujeres y los ancianos tienen menos cantidad de agua en el organismo que los hombres. Anna consecuencia de esto, las mujeres y los ancianos tienen mayor concentración de alcohol en la lilly después de beber la misma cantidad de alcohol.  · La exposición del feto al alcohol puede ocasionar janeth gran cantidad de defectos de nacimientos dominados Síndrome Alcohólico Fetal (FAS) o Defectos de Nacimiento Relacionados con el Alcohol (ARBD). Aunque los FAS y los ARBD están asociados con el consumo excesivo de alcohol cesilia el embarazo, también se amezquita informado trastornos de conducta en niños nacidos de madres que informaron fan bebido un trago en promedio por día cesilia el embarazo.  · El abuso de alcohol (anna el consumo  "de más de cuatro tragos por ocasión en hombres y más de carole tragos por ocasión en mujeres) perjudica a lo cognitivo (aprendizaje) y las funciones psicomotoras e incrementa el riesgo de problemas relacionados con el alcohol, inclusive accidentes y daños.  PREGUNTAS CECA:   · ¿Algunas vez chambers sentido que debía cortar con la bebida?  · ¿Se chambers enojado usted con alguien que lo criticó por lo que roz?  · ¿Alguna vez se ha sentido mal o culpable por lo que roz?  · ¿Ha tomado alguna vez un trago por la mañana para calmar tobias nervios o para deshacerse de janeth \"resaca\" (para \"abrir los ojos\")?  Si ha respondido de manera positiva a alguna de estas preguntas: Podría estar en riesgo de tener problemas relacionados con el alcohol si el consumo del mismo es:   · Hombres: Mayor a 14 tragos por semana o más de 4 tragos por ocasión.  · Mujeres: Mayor a 7 tragos por semana o más de 3 tragos por ocasión.  Tiene usted o webster effie alguna historia clínica de problemas relacionados con el alcohol lars:  · Pérdida del conocimiento.  · Disfunción sexual.  · Depresión.  · Traumatismos.  · Enfermedades hepáticas.  · Trastornos del sueño.  · Hipertensión arterial.  · Dolor crónico abdominal.  · ¿Alguna vez el beber le ha ocasionado problemas, lars por ejemplo con webster effie, en webster rendimiento laboral (o escolar), accidentes o lesiones?  · ¿Ha tenido janeth compulsión a beber o se ha preocupado mientras lo hacía?  · ¿Posee poco control o es incapaz de parar de beber janeth vez que ha comenzado?  · ¿Ha tenido que beber para evitar síntomas de abstinencia?  · ¿Chambers tenido problemas con la abstinencia lars temblores, náuseas, sudor o cambios en el humor?  · ¿Necesita más alcohol que antes para emborracharse?  · ¿Siente janeth buddy necesidad de beber?  · ¿Cambia de planes para poder beber?  · ¿Alguna vez ha bebido en la mañana para aliviar temblores o resaca?  Si ha respondido a alguna de estas preguntas de manera positiva, puede que sea el momento de " hablar con un profesional, familiar o amigos y sarah si ellos creen que tiene un problema. El alcoholismo es janeth dependencia química que puede empeorar y llegar a destruir webster jose eduardo y otbias relaciones. Muchos alcohólicos mueren, se empobrecen o terminan en prisión. [place] es a menudo el resultado de janeth dependencia química.  · No se desaliente si no está listo para actuar inmediatamente.  · Las decisiones para cambiar webster comportamiento a menudo implican altas y bajas entre el deseo de cambiar y la sensación de que no puede decidirse.  · Intente pensar más seriamente sobre webster comportamiento frente a la bebida.  · Piense en razones para dejarla.  PARA OBTENER INFORMACIÓN ADICIONAL, CONCURRIR A:  · The National [place] on Alcohol Abuse and Alcoholism (NIAAA)  www.niaaa.nih.gov   · National [place] on Alcoholism and Drug Dependence (NCADD)  www.ncadd.org  · American Society of Addiction Medicine (ASAM)  www.asam.org   Document Released: 03/26/2009 Document Revised: 03/11/2013  ExitCare® Patient Information ©2014 [employer] for Parents.        Opciones de alimentos para pacientes con reflujo gastroesofágico - Adultos  (Food Choices for Gastroesophageal Reflux Disease, Adult)  Cuando se tiene reflujo gastroesofágico (ERGE), los alimentos que se ingieren y los hábitos de alimentación son muy importantes. Elegir los alimentos adecuados puede ayudar a aliviar las molestias.   ¿QUÉ PAUTAS KAVITA SEGUIR?   · Elija las frutas, los vegetales, los cereales integrales y los productos lácteos con bajo contenido de grasa.  · Elija las renuka de deyanira, de pescado y de ave con bajo contenido de grasas.  · Limite las grasas, lars los aceites, los aderezos para ensalada, la manteca, los antonieta secos y el aguacate.  · Lleve un registro de alimentos. [place] ayuda a identificar los alimentos que ocasionan síntomas.  · Evite los alimentos que le ocasionen síntomas. Pueden ser distintos para cada persona.  · [place]ter comidas pequeñas cesilia el día en lugar de 3  comidas abundantes.  · Coma lentamente, en un lugar donde esté distendido.  · Limite el consumo de alimentos fritos.  · Cocine los alimentos utilizando métodos que no reyna la fritura.  · Evite el consumo alcohol.  · Evite beber grandes cantidades de líquidos con las comidas.  · Evite agacharse o recostarse hasta después de 2 o 3 horas de fan comido.  ¿QUÉ ALIMENTOS NO SE RECOMIENDAN?   Estos son algunos alimentos y bebidas que pueden empeorar los síntomas:  Vegetales  Tomates. Jugo de tomate. Salsa de tomate y espagueti. Ajíes. Cebolla y ajo. Rábano picante.  Frutas  Naranjas, pomelos y stephanie (fruta y jugo).  Audra  Audra de deyanira, de pescado y de ave con gran contenido de grasas. [place] incluye los perros calientes, las costillas, el jamón, la salchicha, el abisai y el tocino.  Lácteos  Leche entera y leche chocolatada. Crema ácida. Crema. Mantequilla. Helados. Queso crema.   Bebidas  Té o café. Bebidas gaseosas o bebidas energizantes.  Condimentos  Salsa picante. Salsa barbacoa.   Dulces/postres  Chocolate y cacao. Rosquillas. Menta y mentol.  Grasas y aceites  Alimentos muy grasos. [place] incluye las destiny fritas.  Otros  Vinagre. Especias picantes. [place] incluye la brittany merissa, la brittany donell, la brittany sruthi, la brittany de cayena, el santa en polvo, los clavos de olor, el jengibre y el chile en polvo.  Esta no es janeth lista completa de los alimentos y las bebidas que se deben evitar. Comuníquese con el nutricionista para recibir más información.     Esta información no tiene lars fin reemplazar el consejo del médico. Asegúrese de hacerle al médico cualquier pregunta que tenga.     Document Released: 06/18/2013 Document Revised: 01/08/2016  ElseMOGL Interactive Patient Education ©2016 Elsevier Inc.

## 2017-10-18 NOTE — PROGRESS NOTES
Received report on patient. Patient is sitting up in bed has no complaints of pain. Bed is in the lowest position and call light and personal belongings within reach.

## 2017-10-18 NOTE — PROGRESS NOTES
[facility]ist Progress Note    Date of Service: 10/17/2017    Chief Complaint  47 y.o. male admitted 10/16/2017 with early alcohol withdrawal and chest pain. He has a known history of diabetes but has been noncompliant with medication and follow-up.    Interval Problem Update  He has a headache, mild tremor.  We discussed alcohol cessation  We discussed his liver disease and the consequences of continued drinking.  He may be able to go home tomorrow if his vital signs and CIWA score remain stable  Patient discussed in multidisciplinary Rounds    Consultants/Specialty  none    Disposition  none        Review of Systems   Respiratory: Negative for cough and shortness of breath.    Cardiovascular: Negative for chest pain.   Gastrointestinal: Negative for abdominal pain, nausea and vomiting.   Genitourinary: Negative for dysuria.   Neurological: Positive for tremors and headaches.   Psychiatric/Behavioral: Positive for depression (claims he gets depressed about his diabetes and this makes him drink.) and substance abuse.      Physical Exam  Laboratory/Imaging   Hemodynamics  Temp (24hrs), Av.7 °C (98 °F), Min:36.3 °C (97.4 °F), Max:37.2 °C (99 °F)   Temperature: 36.6 °C (97.8 °F)  Pulse  Av.1  Min: 66  Max: 91 Heart Rate (Monitored): 69  Blood Pressure: 137/85, NIBP: 125/83      Respiratory      Respiration: 16, Pulse Oximetry: 96 %             Fluids    Intake/Output Summary (Last 24 hours) at 10/17/17 1713  Last data filed at 10/17/17 0000   Gross per 24 hour   Intake             1480 ml   Output              800 ml   Net              680 ml       Nutrition  Orders Placed This Encounter   Procedures   • Diet Order     Standing Status:   Standing     Number of Occurrences:   1     Order Specific Question:   Diet:     Answer:   Cardiac [6]     Order Specific Question:   Diet:     Answer:   Consistent Carbohydrate [4]     Physical Exam   Constitutional: He is oriented to person, place, and time. He appears  well-developed and well-nourished.   Mildly overweight  Mildly disheveled   HENT:   Head: Normocephalic and atraumatic.   Mouth/Throat: Oropharynx is clear and moist.   Dentition fair   Eyes: EOM are normal. Pupils are equal, round, and reactive to light. Right eye exhibits no discharge. Left eye exhibits no discharge. Scleral icterus is present.   Neck: Neck supple.   Cardiovascular: Normal rate and regular rhythm.    Pulmonary/Chest: Effort normal and breath sounds normal.   Abdominal: Soft. Bowel sounds are normal. He exhibits no distension (right upper quadrant fullness). There is no tenderness. There is no rebound and no guarding.   Musculoskeletal: He exhibits no edema or tenderness.   Neurological: He is alert and oriented to person, place, and time. No cranial nerve deficit.   Minimal tremor   Skin: Skin is warm and dry.   Psychiatric: He has a normal mood and affect.   Nursing note and vitals reviewed.      Recent Labs      10/16/17   1423   WBC  6.5   RBC  5.36   HEMOGLOBIN  17.8   HEMATOCRIT  48.0   MCV  89.6   MCH  33.2*   MCHC  37.1*   RDW  38.3   PLATELETCT  54*   MPV  12.9     Recent Labs      10/16/17   1423  10/17/17   0503   SODIUM  131*  134*   POTASSIUM  3.6  3.4*   CHLORIDE  95*  99   CO2  26  28   GLUCOSE  240*  214*   BUN  6*  8   CREATININE  0.70  0.69   CALCIUM  9.9  8.9     Recent Labs      10/16/17   1423   APTT  28.6   INR  1.09     Recent Labs      10/16/17   1423   BNPBTYPENAT  12     Recent Labs      10/17/17   0757   TRIGLYCERIDE  320*   HDL  36*   LDL  118*          Assessment/Plan     * Alcoholic gastritis- (present on admission)   Assessment & Plan    He presented with chest pain she describes as burning as well as epigastric pain.  Started on PPI        Thrombocytopenia (CMS-[facility])- (present on admission)   Assessment & Plan    Likely related to alcohol. Currently not bleeding. Trend platelets.        Alcohol withdrawal (CMS-HCC)- (present on admission)   Assessment & Plan    On  CIWA and vitamin supplements        Chest pain   Assessment & Plan    No ischemia on MPI          Headache   Assessment & Plan    Prn tylenol        Hypokalemia   Assessment & Plan    Related to EtOH abuse  Checked Magnesium and Phosphorus> OK  Replace orally  Will also go down as sugars improve        Type 2 diabetes mellitus (CMS-HCC)- (present on admission)   Assessment & Plan    Non compliant  On SSI, changed diet  A1c 8.1        Alcoholic hepatitis- (present on admission)   Assessment & Plan    He has scleral icterus that is mild.  He has a transaminitis and elevated bilirubin.  Fatty liver on Sono  Serologies for hepatitis negative        Alcohol dependence (CMS-HCC)- (present on admission)   Assessment & Plan    Cessation discussed w/ patient (longest abstinence 2 years)  Consequences of ongoing use discussed- he will not survive to become old            Reviewed items::  Labs reviewed and Medications reviewed  [place] catheter::  No Lemos  DVT prophylaxis - mechanical:  SCDs  Ulcer Prophylaxis::  Yes

## 2017-10-18 NOTE — PROGRESS NOTES
Patient discharged home via car with self. All belongings accounted for and discharge education provided, signed and in chart. Pharmacy updated and patient provided with appropriate scripts. Charge RN notified and Core measures complete.   Patient spoke to SW about medication co-pay and states that it is affordable for him.

## 2017-10-18 NOTE — PROGRESS NOTES
Bedside report performed with Radha. Pt is a/o, safety check performed, all possessions and call bell within reach. POC and doctors orders addressed as needed.

## 2017-10-19 NOTE — DISCHARGE SUMMARY
CHIEF COMPLAINT ON ADMISSION  Chief Complaint   Patient presents with   • Chest Pain     CP left sided x 2 days   • N/V   • Cold Symptoms       CODE STATUS  Prior    HPI & HOSPITAL COURSE  47 y.o. male admitted 10/16/2017 with early alcohol withdrawal and chest pain. He has a known history of diabetes but has been noncompliant with medication and follow-up.    Patient's chest pain was atypical in nature and his myocardial perfusion imaging was negative for ischemia. He exhibited moderate alcohol withdrawal symptoms initially consisting of tremor mild headache and dizziness, but the 2nd day he only had trace headache and his tremor had resolved. We discussed alcohol cessation in detail given the fact that he had hepatomegaly and abnormal LFTs consistent with moderate alcoholic liver disease. Patient was receptive to cessation plan and was making post discharge plans to change his lifestyle. Diabetes follow-up was arranged with new primary care provider. Even though he was on sliding scale during his stay, he did not require coverage, therefore metformin would be appropriate at discharge with strict dietary compliance.    Therefore, he is discharged in good and stable condition with close outpatient follow-up.    SPECIFIC OUTPATIENT FOLLOW-UP  Primary care    DISCHARGE PROBLEM LIST  Principal Problem:    Alcoholic gastritis POA: Yes  Active Problems:    Alcohol withdrawal (CMS-HCC) POA: Yes    Thrombocytopenia (CMS-HCC) POA: Yes    Alcohol dependence (CMS-HCC) POA: Yes    Alcoholic hepatitis POA: Yes    Type 2 diabetes mellitus (CMS-HCC) POA: Yes    Hypokalemia POA: Yes    Headache POA: Yes    Chest pain POA: Yes  Resolved Problems:    * No resolved hospital problems. *      FOLLOW UP  No future appointments.  INO Wilkes  [address] [address]  [phone]    Go on 11/3/2017  Please arrive at 11:00 am for a 11:30 am appointment. Thank you.    Pcp Pt States  None            MEDICATIONS ON DISCHARGE   Pratik Ruth   Home Medication Instructions KELLY:[id number]    Printed on:10/19/17 0784   Medication Information                      metformin (GLUCOPHAGE) 500 MG Tab  Take 1 Tab by mouth 2 times a day, with meals.             potassium chloride SA (KDUR) 20 MEQ Tab CR  Take 1 Tab by mouth every day.                 DIET  Carbohydrate restricted/ diabetic  NO ALCOHOL    ACTIVITY  As tolerated      CONSULTATIONS  none    PROCEDURES  none    LABORATORY  Lab Results   Component Value Date/Time    SODIUM 135 10/18/2017 02:59 AM    POTASSIUM 3.7 10/18/2017 02:59 AM    CHLORIDE 101 10/18/2017 02:59 AM    CO2 27 10/18/2017 02:59 AM    GLUCOSE 139 (H) 10/18/2017 02:59 AM    BUN 7 (L) 10/18/2017 02:59 AM    CREATININE 0.70 10/18/2017 02:59 AM        Lab Results   Component Value Date/Time    WBC 6.5 10/16/2017 02:23 PM    HEMOGLOBIN 17.8 10/16/2017 02:23 PM    HEMATOCRIT 48.0 10/16/2017 02:23 PM    PLATELETCT 54 (L) 10/16/2017 02:23 PM        Total time of the discharge process exceeds 38 minutes

## 2020-02-13 ENCOUNTER — HOSPITAL ENCOUNTER (EMERGENCY)
Dept: HOSPITAL 8 - ED | Age: 50
Discharge: HOME | End: 2020-02-13
Payer: SELF-PAY

## 2020-02-13 VITALS — WEIGHT: 145.51 LBS | BODY MASS INDEX: 24.84 KG/M2 | HEIGHT: 64 IN

## 2020-02-13 VITALS — DIASTOLIC BLOOD PRESSURE: 97 MMHG | SYSTOLIC BLOOD PRESSURE: 140 MMHG

## 2020-02-13 DIAGNOSIS — S50.862A: Primary | ICD-10-CM

## 2020-02-13 DIAGNOSIS — E11.9: ICD-10-CM
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
## 2020-02-13 DIAGNOSIS — S80.861A: ICD-10-CM
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
## 2020-02-13 PROCEDURE — 99283 EMERGENCY DEPT VISIT LOW MDM: CPT

## 2020-03-09 ENCOUNTER — OFFICE VISIT (OUTPATIENT)
Dept: URGENT CARE | Facility: PHYSICIAN GROUP | Age: 50
End: 2020-03-09

## 2020-03-09 VITALS
WEIGHT: 149 LBS | HEIGHT: 65 IN | HEART RATE: 127 BPM | SYSTOLIC BLOOD PRESSURE: 132 MMHG | DIASTOLIC BLOOD PRESSURE: 76 MMHG | RESPIRATION RATE: 16 BRPM | BODY MASS INDEX: 24.83 KG/M2 | TEMPERATURE: 97 F | OXYGEN SATURATION: 99 %

## 2020-03-09 DIAGNOSIS — R73.9 HYPERGLYCEMIA: ICD-10-CM

## 2020-03-09 LAB — GLUCOSE BLD-MCNC: NORMAL MG/DL (ref 70–100)

## 2020-03-09 PROCEDURE — 82962 GLUCOSE BLOOD TEST: CPT | Performed by: FAMILY MEDICINE

## 2020-03-09 PROCEDURE — 99203 OFFICE O/P NEW LOW 30 MIN: CPT | Performed by: FAMILY MEDICINE

## 2020-03-09 RX ORDER — SYRINGE AND NEEDLE,INSULIN,1ML 25GX5/8"
SYRINGE, EMPTY DISPOSABLE MISCELLANEOUS
COMMUNITY
Start: 2016-01-18

## 2020-03-09 RX ORDER — BLOOD SUGAR DIAGNOSTIC
1 STRIP MISCELLANEOUS
COMMUNITY
Start: 2016-01-18

## 2020-03-09 ASSESSMENT — ENCOUNTER SYMPTOMS
DIZZINESS: 1
ABDOMINAL PAIN: 1
VOMITING: 1
SHORTNESS OF BREATH: 0
FEVER: 0
NAUSEA: 1

## 2020-03-09 NOTE — PROGRESS NOTES
"Subjective:     Pratik TAVAREZ is a 50 y.o. male who presents for Emesis (vomiting, nausea, weakness, chest pain  x1 day, high Blood sugar)    HPI  Pt presents for evaluation of a new problem   Patient with nausea, vomiting, weakness, and chest pain which just started today  Patient took blood sugar at home and at home monitor showed glucose in the 500s  Point-of-care glucose here in office reads \"high\"   Last emesis was in the waiting room  Feeling very fatigued  No diarrhea     Review of Systems   Constitutional: Negative for fever.   Respiratory: Negative for shortness of breath.    Cardiovascular: Positive for chest pain.   Gastrointestinal: Positive for abdominal pain, nausea and vomiting.   Skin: Negative for rash.   Neurological: Positive for dizziness.     PMH:  has a past medical history of Alcohol abuse and Drug abuse ([facility]).  MEDS:   Current Outpatient Medications:   •  Insulin Aspart (NOVOLOG SC), Inject  as instructed. 10L 100 unit/mL, Disp: , Rfl:   •  insulin detemir (LEVEMIR) 100 UNIT/ML Solution, Inject  as instructed every evening., Disp: , Rfl:   •  Insulin Syringe-Needle U-100 (MONOJECT INS SYR 1CC/25G) 25G X 5/8\" 1 ML Misc, Check sugar twice daily, Disp: , Rfl:   •  glucose blood (FREESTYLE TEST STRIPS) strip, 1 Strip., Disp: , Rfl:   •  metformin (GLUCOPHAGE) 500 MG Tab, Take 1 Tab by mouth 2 times a day, with meals., Disp: 60 Tab, Rfl: 12  •  potassium chloride SA (KDUR) 20 MEQ Tab CR, Take 1 Tab by mouth every day. (Patient not taking: Reported on 3/9/2020), Disp: 30 Tab, Rfl: 1  ALLERGIES: No Known Allergies  SURGHX:   Past Surgical History:   Procedure Laterality Date   • HERNIA REPAIR       SOCHX:  reports that he has never smoked. He has never used smokeless tobacco. He reports current alcohol use. He reports previous drug use.  FH: Family history was reviewed, not contributing to acute complaint      Objective:   /76   Pulse (!) 127   Temp 36.1 °C (97 °F)   Resp 16   " "Ht 1.651 m (5' 5\")   Wt 67.6 kg (149 lb)   SpO2 99%   BMI 24.79 kg/m²     Physical Exam  Constitutional:       Appearance: He is well-developed. He is ill-appearing.   HENT:      Head: Normocephalic and atraumatic.   Skin:     General: Skin is warm and dry.      Findings: No rash.   Neurological:      Mental Status: He is alert and oriented to person, place, and time.   Psychiatric:         Mood and Affect: Mood normal.         Behavior: Behavior normal.         Thought Content: Thought content normal.         Judgment: Judgment normal.       Assessment/Plan:   Assessment    1. Hyperglycemia  - POCT Glucose    Patient with hyperglycemia and likely in DKA.  Point-of-care glucose reads \"high\" in office today.  Patient prefers to go to [facility] as it is only 2 blocks away.  Called and given report to ER physician Dr. Garnica and patient sent to emergency room.    "

## 2021-01-07 ENCOUNTER — HOSPITAL ENCOUNTER (INPATIENT)
Dept: HOSPITAL 8 - ED | Age: 51
LOS: 5 days | Discharge: HOME | DRG: 637 | End: 2021-01-12
Attending: HOSPITALIST | Admitting: HOSPITALIST
Payer: COMMERCIAL

## 2021-01-07 VITALS — HEIGHT: 65 IN | BODY MASS INDEX: 24.57 KG/M2 | WEIGHT: 147.49 LBS

## 2021-01-07 DIAGNOSIS — N17.0: ICD-10-CM

## 2021-01-07 DIAGNOSIS — K29.70: ICD-10-CM

## 2021-01-07 DIAGNOSIS — F41.9: ICD-10-CM

## 2021-01-07 DIAGNOSIS — F32.9: ICD-10-CM

## 2021-01-07 DIAGNOSIS — D69.6: ICD-10-CM

## 2021-01-07 DIAGNOSIS — E87.6: ICD-10-CM

## 2021-01-07 DIAGNOSIS — K76.0: ICD-10-CM

## 2021-01-07 DIAGNOSIS — K85.20: ICD-10-CM

## 2021-01-07 DIAGNOSIS — Z59.0: ICD-10-CM

## 2021-01-07 DIAGNOSIS — F10.229: ICD-10-CM

## 2021-01-07 DIAGNOSIS — E11.43: ICD-10-CM

## 2021-01-07 DIAGNOSIS — K31.84: ICD-10-CM

## 2021-01-07 DIAGNOSIS — E78.5: ICD-10-CM

## 2021-01-07 DIAGNOSIS — F10.239: ICD-10-CM

## 2021-01-07 DIAGNOSIS — E11.10: Primary | ICD-10-CM

## 2021-01-07 DIAGNOSIS — Z91.14: ICD-10-CM

## 2021-01-07 DIAGNOSIS — E78.1: ICD-10-CM

## 2021-01-07 DIAGNOSIS — E86.0: ICD-10-CM

## 2021-01-07 DIAGNOSIS — K70.10: ICD-10-CM

## 2021-01-07 LAB
ACETONE, SERUM: (no result)
ALBUMIN SERPL-MCNC: 3.7 G/DL (ref 3.4–5)
ALP SERPL-CCNC: 135 U/L (ref 45–117)
ALT SERPL-CCNC: 79 U/L (ref 12–78)
ANION GAP SERPL CALC-SCNC: 11 MMOL/L (ref 5–15)
ANION GAP SERPL CALC-SCNC: 18 MMOL/L (ref 5–15)
ANION GAP SERPL CALC-SCNC: 22 MMOL/L (ref 5–15)
ANION GAP SERPL CALC-SCNC: 26 MMOL/L (ref 5–15)
BASOPHILS # BLD AUTO: 0 X10^3/UL (ref 0–0.1)
BASOPHILS NFR BLD AUTO: 0 % (ref 0–1)
BILIRUB SERPL-MCNC: 1.1 MG/DL (ref 0.2–1)
CALCIUM SERPL-MCNC: 7.4 MG/DL (ref 8.5–10.1)
CALCIUM SERPL-MCNC: 7.5 MG/DL (ref 8.5–10.1)
CALCIUM SERPL-MCNC: 7.6 MG/DL (ref 8.5–10.1)
CALCIUM SERPL-MCNC: 8.4 MG/DL (ref 8.5–10.1)
CHLORIDE SERPL-SCNC: 103 MMOL/L (ref 98–107)
CHLORIDE SERPL-SCNC: 108 MMOL/L (ref 98–107)
CHLORIDE SERPL-SCNC: 112 MMOL/L (ref 98–107)
CHLORIDE SERPL-SCNC: 95 MMOL/L (ref 98–107)
CHOL/HDL RATIO: 7.5
CREAT SERPL-MCNC: 0.98 MG/DL (ref 0.7–1.3)
CREAT SERPL-MCNC: 1.02 MG/DL (ref 0.7–1.3)
CREAT SERPL-MCNC: 1.17 MG/DL (ref 0.7–1.3)
CREAT SERPL-MCNC: 1.56 MG/DL (ref 0.7–1.3)
EOSINOPHIL # BLD AUTO: 0 X10^3/UL (ref 0–0.4)
EOSINOPHIL NFR BLD AUTO: 0 % (ref 1–7)
ERYTHROCYTE [DISTWIDTH] IN BLOOD BY AUTOMATED COUNT: 12.8 % (ref 9.4–14.8)
EST. AVERAGE GLUCOSE BLD GHB EST-MCNC: 298 MG/DL (ref 0–126)
HDL CHOL %: 13 % (ref 26–37)
HDL CHOLESTEROL (DIRECT): 43 MG/DL (ref 40–60)
LDL CHOLESTEROL,CALCULATED: (no result) MG/DL (ref 54–169)
LDLC/HDLC SERPL: (no result) {RATIO} (ref 0.5–3)
LYMPHOCYTES # BLD AUTO: 0.4 X10^3/UL (ref 1–3.4)
LYMPHOCYTES NFR BLD AUTO: 4 % (ref 22–44)
MCH RBC QN AUTO: 32.8 PG (ref 27.5–34.5)
MCHC RBC AUTO-ENTMCNC: 34.6 G/DL (ref 33.2–36.2)
MD: (no result)
MICROSCOPIC: (no result)
MONOCYTES # BLD AUTO: 0.4 X10^3/UL (ref 0.2–0.8)
MONOCYTES NFR BLD AUTO: 4 % (ref 2–9)
NEUTROPHILS # BLD AUTO: 8.7 X10^3/UL (ref 1.8–6.8)
NEUTROPHILS NFR BLD AUTO: 91 % (ref 42–75)
PH BLDV: 7.18 PH (ref 7.32–7.42)
PLATELET # BLD AUTO: 88 X10^3/UL (ref 130–400)
PMV BLD AUTO: 10.4 FL (ref 7.4–10.4)
PROT SERPL-MCNC: 7.3 G/DL (ref 6.4–8.2)
RBC # BLD AUTO: 4.77 X10^6/UL (ref 4.38–5.82)
T4 FREE SERPL-MCNC: 0.74 NG/DL (ref 0.76–1.46)
TRIGL SERPL-MCNC: 1824 MG/DL (ref 50–200)
TROPONIN I SERPL-MCNC: < 0.015 NG/ML (ref 0–0.04)
VLDLC SERPL CALC-MCNC: (no result) MG/DL (ref 0–25)

## 2021-01-07 PROCEDURE — 71045 X-RAY EXAM CHEST 1 VIEW: CPT

## 2021-01-07 PROCEDURE — 36415 COLL VENOUS BLD VENIPUNCTURE: CPT

## 2021-01-07 PROCEDURE — 82010 KETONE BODYS QUAN: CPT

## 2021-01-07 PROCEDURE — 80074 ACUTE HEPATITIS PANEL: CPT

## 2021-01-07 PROCEDURE — 74018 RADEX ABDOMEN 1 VIEW: CPT

## 2021-01-07 PROCEDURE — 83735 ASSAY OF MAGNESIUM: CPT

## 2021-01-07 PROCEDURE — 93005 ELECTROCARDIOGRAM TRACING: CPT

## 2021-01-07 PROCEDURE — 80053 COMPREHEN METABOLIC PANEL: CPT

## 2021-01-07 PROCEDURE — 80048 BASIC METABOLIC PNL TOTAL CA: CPT

## 2021-01-07 PROCEDURE — 87081 CULTURE SCREEN ONLY: CPT

## 2021-01-07 PROCEDURE — 80061 LIPID PANEL: CPT

## 2021-01-07 PROCEDURE — 83690 ASSAY OF LIPASE: CPT

## 2021-01-07 PROCEDURE — 84439 ASSAY OF FREE THYROXINE: CPT

## 2021-01-07 PROCEDURE — 84100 ASSAY OF PHOSPHORUS: CPT

## 2021-01-07 PROCEDURE — 81001 URINALYSIS AUTO W/SCOPE: CPT

## 2021-01-07 PROCEDURE — 76700 US EXAM ABDOM COMPLETE: CPT

## 2021-01-07 PROCEDURE — 82962 GLUCOSE BLOOD TEST: CPT

## 2021-01-07 PROCEDURE — 82803 BLOOD GASES ANY COMBINATION: CPT

## 2021-01-07 PROCEDURE — 84443 ASSAY THYROID STIM HORMONE: CPT

## 2021-01-07 PROCEDURE — 80320 DRUG SCREEN QUANTALCOHOLS: CPT

## 2021-01-07 PROCEDURE — 83036 HEMOGLOBIN GLYCOSYLATED A1C: CPT

## 2021-01-07 PROCEDURE — 85025 COMPLETE CBC W/AUTO DIFF WBC: CPT

## 2021-01-07 PROCEDURE — G0480 DRUG TEST DEF 1-7 CLASSES: HCPCS

## 2021-01-07 PROCEDURE — 84484 ASSAY OF TROPONIN QUANT: CPT

## 2021-01-07 PROCEDURE — 84478 ASSAY OF TRIGLYCERIDES: CPT

## 2021-01-07 PROCEDURE — 87040 BLOOD CULTURE FOR BACTERIA: CPT

## 2021-01-07 RX ADMIN — THIAMINE HYDROCHLORIDE SCH MLS/HR: 100 INJECTION, SOLUTION INTRAMUSCULAR; INTRAVENOUS at 15:01

## 2021-01-07 RX ADMIN — PHENOBARBITAL SODIUM SCH MG: 65 INJECTION INTRAMUSCULAR; INTRAVENOUS at 22:33

## 2021-01-07 RX ADMIN — SODIUM CHLORIDE SCH MLS/HR: 0.9 INJECTION, SOLUTION INTRAVENOUS at 19:25

## 2021-01-07 RX ADMIN — SODIUM CHLORIDE SCH MLS/HR: 0.9 INJECTION, SOLUTION INTRAVENOUS at 14:04

## 2021-01-07 RX ADMIN — SODIUM CHLORIDE SCH MLS/HR: 0.9 INJECTION, SOLUTION INTRAVENOUS at 22:55

## 2021-01-07 RX ADMIN — DEXTROSE, SODIUM CHLORIDE, AND POTASSIUM CHLORIDE SCH MLS/HR: 5; .45; .15 INJECTION INTRAVENOUS at 19:29

## 2021-01-07 SDOH — ECONOMIC STABILITY - HOUSING INSECURITY: HOMELESSNESS: Z59.0

## 2021-01-07 NOTE — NUR
.  URINE COLLECTED/SENT TO LAB.  PT C/O PERSISTENT NAUSEA, REQUEST FOR 
ADDITIONAL ANTIEMETIC TO ERP.

## 2021-01-07 NOTE — NUR
[facility] IN TO SEE PT.  CLARIFICATION OF ORDERS, PER [facility], CX 2LITER NS BOLUS.  PT 
ALLOWED TO HAVE ICE CHIPS.  ADDITIONAL LABS DRAWN.  INSULIN GTT INFUSING PER 
[facility].  PT RESTING, STATES NAUSEA AND PAIN BETTER AT THIS TIME.  CALL LIGHT 
WITHIN REACH.

## 2021-01-07 NOTE — NUR
PT PLACE ON ALL ROOM MONITORING.  EKG DONE ON ARRIVAL TO ROOM.  FS READ HI.  PT 
STATES HX OF HTN, HIGH CHOL, IDDM, GASTRITIS, NEUROPATHY.  PT A/O X 3 BUT 
APPEARS INTOXICATED AND REPORTS OF HEAVY BINGE DRINKING.  C/O "STOMACH" PAIN, 
TENDER TO RUQ AND N/V.  CALL LIGHT WITHIN REACH.

## 2021-01-08 VITALS — SYSTOLIC BLOOD PRESSURE: 135 MMHG | DIASTOLIC BLOOD PRESSURE: 88 MMHG

## 2021-01-08 VITALS — DIASTOLIC BLOOD PRESSURE: 75 MMHG | SYSTOLIC BLOOD PRESSURE: 123 MMHG

## 2021-01-08 VITALS — SYSTOLIC BLOOD PRESSURE: 144 MMHG | DIASTOLIC BLOOD PRESSURE: 86 MMHG

## 2021-01-08 LAB
ANION GAP SERPL CALC-SCNC: 10 MMOL/L (ref 5–15)
ANION GAP SERPL CALC-SCNC: 10 MMOL/L (ref 5–15)
CALCIUM SERPL-MCNC: 7.1 MG/DL (ref 8.5–10.1)
CALCIUM SERPL-MCNC: 7.2 MG/DL (ref 8.5–10.1)
CHLORIDE SERPL-SCNC: 110 MMOL/L (ref 98–107)
CHLORIDE SERPL-SCNC: 111 MMOL/L (ref 98–107)
CREAT SERPL-MCNC: 0.89 MG/DL (ref 0.7–1.3)
CREAT SERPL-MCNC: 0.91 MG/DL (ref 0.7–1.3)
TRIGL SERPL-MCNC: 402 MG/DL (ref 50–200)

## 2021-01-08 RX ADMIN — INSULIN LISPRO SCH UNITS: 100 INJECTION, SOLUTION INTRAVENOUS; SUBCUTANEOUS at 17:17

## 2021-01-08 RX ADMIN — THIAMINE HYDROCHLORIDE SCH MLS/HR: 100 INJECTION, SOLUTION INTRAMUSCULAR; INTRAVENOUS at 09:29

## 2021-01-08 RX ADMIN — METOCLOPRAMIDE PRN MG: 5 INJECTION, SOLUTION INTRAMUSCULAR; INTRAVENOUS at 12:58

## 2021-01-08 RX ADMIN — PHENOBARBITAL SCH MG: 20 ELIXIR ORAL at 22:07

## 2021-01-08 RX ADMIN — DEXTROSE, SODIUM CHLORIDE, AND POTASSIUM CHLORIDE SCH MLS/HR: 5; .45; .15 INJECTION INTRAVENOUS at 03:54

## 2021-01-08 RX ADMIN — INSULIN LISPRO SCH UNITS: 100 INJECTION, SOLUTION INTRAVENOUS; SUBCUTANEOUS at 21:00

## 2021-01-08 RX ADMIN — INSULIN LISPRO SCH UNITS: 100 INJECTION, SOLUTION INTRAVENOUS; SUBCUTANEOUS at 12:04

## 2021-01-08 RX ADMIN — SODIUM CHLORIDE, PRESERVATIVE FREE SCH ML: 5 INJECTION INTRAVENOUS at 21:29

## 2021-01-08 RX ADMIN — INSULIN GLARGINE SCH UNITS: 100 INJECTION, SOLUTION SUBCUTANEOUS at 21:28

## 2021-01-08 RX ADMIN — PHENOBARBITAL SODIUM SCH MG: 65 INJECTION INTRAMUSCULAR; INTRAVENOUS at 09:29

## 2021-01-08 RX ADMIN — ESOMEPRAZOLE SODIUM SCH MG: 40 INJECTION INTRAVENOUS at 08:16

## 2021-01-08 RX ADMIN — ACETAMINOPHEN PRN MG: 325 TABLET, FILM COATED ORAL at 12:58

## 2021-01-09 VITALS — DIASTOLIC BLOOD PRESSURE: 68 MMHG | SYSTOLIC BLOOD PRESSURE: 114 MMHG

## 2021-01-09 VITALS — DIASTOLIC BLOOD PRESSURE: 76 MMHG | SYSTOLIC BLOOD PRESSURE: 125 MMHG

## 2021-01-09 VITALS — DIASTOLIC BLOOD PRESSURE: 72 MMHG | SYSTOLIC BLOOD PRESSURE: 113 MMHG

## 2021-01-09 VITALS — SYSTOLIC BLOOD PRESSURE: 124 MMHG | DIASTOLIC BLOOD PRESSURE: 78 MMHG

## 2021-01-09 LAB
ALBUMIN SERPL-MCNC: 3.1 G/DL (ref 3.4–5)
ALP SERPL-CCNC: 91 U/L (ref 45–117)
ALT SERPL-CCNC: 54 U/L (ref 12–78)
ANION GAP SERPL CALC-SCNC: 11 MMOL/L (ref 5–15)
BASOPHILS # BLD AUTO: 0 X10^3/UL (ref 0–0.1)
BASOPHILS NFR BLD AUTO: 1 % (ref 0–1)
BILIRUB SERPL-MCNC: 1.8 MG/DL (ref 0.2–1)
CALCIUM SERPL-MCNC: 7.7 MG/DL (ref 8.5–10.1)
CHLORIDE SERPL-SCNC: 97 MMOL/L (ref 98–107)
CREAT SERPL-MCNC: 0.67 MG/DL (ref 0.7–1.3)
EOSINOPHIL # BLD AUTO: 0 X10^3/UL (ref 0–0.4)
EOSINOPHIL NFR BLD AUTO: 1 % (ref 1–7)
ERYTHROCYTE [DISTWIDTH] IN BLOOD BY AUTOMATED COUNT: 12.5 % (ref 9.4–14.8)
LYMPHOCYTES # BLD AUTO: 1.2 X10^3/UL (ref 1–3.4)
LYMPHOCYTES NFR BLD AUTO: 23 % (ref 22–44)
MCH RBC QN AUTO: 32.3 PG (ref 27.5–34.5)
MCHC RBC AUTO-ENTMCNC: 35.1 G/DL (ref 33.2–36.2)
MD: (no result)
MONOCYTES # BLD AUTO: 0.4 X10^3/UL (ref 0.2–0.8)
MONOCYTES NFR BLD AUTO: 8 % (ref 2–9)
NEUTROPHILS # BLD AUTO: 3.8 X10^3/UL (ref 1.8–6.8)
NEUTROPHILS NFR BLD AUTO: 69 % (ref 42–75)
PLATELET # BLD AUTO: 46 X10^3/UL (ref 130–400)
PMV BLD AUTO: 10.8 FL (ref 7.4–10.4)
PROT SERPL-MCNC: 5.9 G/DL (ref 6.4–8.2)
RBC # BLD AUTO: 3.96 X10^6/UL (ref 4.38–5.82)

## 2021-01-09 RX ADMIN — INSULIN LISPRO SCH UNITS: 100 INJECTION, SOLUTION INTRAVENOUS; SUBCUTANEOUS at 08:14

## 2021-01-09 RX ADMIN — INSULIN LISPRO SCH UNITS: 100 INJECTION, SOLUTION INTRAVENOUS; SUBCUTANEOUS at 11:52

## 2021-01-09 RX ADMIN — ESOMEPRAZOLE SODIUM SCH MG: 40 INJECTION INTRAVENOUS at 07:28

## 2021-01-09 RX ADMIN — SODIUM CHLORIDE, PRESERVATIVE FREE SCH ML: 5 INJECTION INTRAVENOUS at 20:52

## 2021-01-09 RX ADMIN — INSULIN LISPRO SCH UNITS: 100 INJECTION, SOLUTION INTRAVENOUS; SUBCUTANEOUS at 16:46

## 2021-01-09 RX ADMIN — Medication SCH MG: at 08:09

## 2021-01-09 RX ADMIN — INSULIN GLARGINE SCH UNITS: 100 INJECTION, SOLUTION SUBCUTANEOUS at 08:14

## 2021-01-09 RX ADMIN — INSULIN LISPRO SCH UNITS: 100 INJECTION, SOLUTION INTRAVENOUS; SUBCUTANEOUS at 08:13

## 2021-01-09 RX ADMIN — INSULIN LISPRO SCH UNITS: 100 INJECTION, SOLUTION INTRAVENOUS; SUBCUTANEOUS at 11:53

## 2021-01-09 RX ADMIN — INSULIN LISPRO SCH UNITS: 100 INJECTION, SOLUTION INTRAVENOUS; SUBCUTANEOUS at 21:03

## 2021-01-09 RX ADMIN — PHENOBARBITAL SCH MG: 20 ELIXIR ORAL at 08:12

## 2021-01-09 RX ADMIN — METOCLOPRAMIDE PRN MG: 5 INJECTION, SOLUTION INTRAMUSCULAR; INTRAVENOUS at 07:28

## 2021-01-09 RX ADMIN — ACETAMINOPHEN PRN MG: 325 TABLET, FILM COATED ORAL at 08:11

## 2021-01-09 RX ADMIN — PHENOBARBITAL SCH MG: 20 ELIXIR ORAL at 20:52

## 2021-01-09 RX ADMIN — SODIUM CHLORIDE, PRESERVATIVE FREE SCH ML: 5 INJECTION INTRAVENOUS at 07:29

## 2021-01-09 RX ADMIN — INSULIN GLARGINE SCH UNITS: 100 INJECTION, SOLUTION SUBCUTANEOUS at 21:04

## 2021-01-10 VITALS — DIASTOLIC BLOOD PRESSURE: 78 MMHG | SYSTOLIC BLOOD PRESSURE: 121 MMHG

## 2021-01-10 VITALS — SYSTOLIC BLOOD PRESSURE: 125 MMHG | DIASTOLIC BLOOD PRESSURE: 84 MMHG

## 2021-01-10 VITALS — DIASTOLIC BLOOD PRESSURE: 76 MMHG | SYSTOLIC BLOOD PRESSURE: 115 MMHG

## 2021-01-10 VITALS — DIASTOLIC BLOOD PRESSURE: 75 MMHG | SYSTOLIC BLOOD PRESSURE: 113 MMHG

## 2021-01-10 LAB
ALBUMIN SERPL-MCNC: 3.1 G/DL (ref 3.4–5)
ALP SERPL-CCNC: 93 U/L (ref 45–117)
ALT SERPL-CCNC: 70 U/L (ref 12–78)
ANION GAP SERPL CALC-SCNC: 8 MMOL/L (ref 5–15)
BASOPHILS # BLD AUTO: 0 X10^3/UL (ref 0–0.1)
BASOPHILS NFR BLD AUTO: 0 % (ref 0–1)
BILIRUB SERPL-MCNC: 1 MG/DL (ref 0.2–1)
CALCIUM SERPL-MCNC: 8.5 MG/DL (ref 8.5–10.1)
CHLORIDE SERPL-SCNC: 97 MMOL/L (ref 98–107)
CREAT SERPL-MCNC: 0.57 MG/DL (ref 0.7–1.3)
EOSINOPHIL # BLD AUTO: 0 X10^3/UL (ref 0–0.4)
EOSINOPHIL NFR BLD AUTO: 1 % (ref 1–7)
ERYTHROCYTE [DISTWIDTH] IN BLOOD BY AUTOMATED COUNT: 12.6 % (ref 9.4–14.8)
LYMPHOCYTES # BLD AUTO: 1.3 X10^3/UL (ref 1–3.4)
LYMPHOCYTES NFR BLD AUTO: 33 % (ref 22–44)
MCH RBC QN AUTO: 32.3 PG (ref 27.5–34.5)
MCHC RBC AUTO-ENTMCNC: 35.8 G/DL (ref 33.2–36.2)
MD: (no result)
MONOCYTES # BLD AUTO: 0.4 X10^3/UL (ref 0.2–0.8)
MONOCYTES NFR BLD AUTO: 9 % (ref 2–9)
NEUTROPHILS # BLD AUTO: 2.3 X10^3/UL (ref 1.8–6.8)
NEUTROPHILS NFR BLD AUTO: 58 % (ref 42–75)
PLATELET # BLD AUTO: 41 X10^3/UL (ref 130–400)
PMV BLD AUTO: 10.4 FL (ref 7.4–10.4)
PROT SERPL-MCNC: 6.2 G/DL (ref 6.4–8.2)
RBC # BLD AUTO: 4.05 X10^6/UL (ref 4.38–5.82)

## 2021-01-10 RX ADMIN — PHENOBARBITAL SCH MG: 20 ELIXIR ORAL at 08:29

## 2021-01-10 RX ADMIN — INSULIN LISPRO SCH UNITS: 100 INJECTION, SOLUTION INTRAVENOUS; SUBCUTANEOUS at 16:19

## 2021-01-10 RX ADMIN — INSULIN GLARGINE SCH UNITS: 100 INJECTION, SOLUTION SUBCUTANEOUS at 20:10

## 2021-01-10 RX ADMIN — INSULIN GLARGINE SCH UNITS: 100 INJECTION, SOLUTION SUBCUTANEOUS at 08:11

## 2021-01-10 RX ADMIN — PHENOBARBITAL SCH MG: 20 ELIXIR ORAL at 20:03

## 2021-01-10 RX ADMIN — Medication SCH MG: at 08:10

## 2021-01-10 RX ADMIN — INSULIN LISPRO SCH UNITS: 100 INJECTION, SOLUTION INTRAVENOUS; SUBCUTANEOUS at 08:13

## 2021-01-10 RX ADMIN — INSULIN LISPRO SCH UNITS: 100 INJECTION, SOLUTION INTRAVENOUS; SUBCUTANEOUS at 11:13

## 2021-01-10 RX ADMIN — ESOMEPRAZOLE SODIUM SCH MG: 40 INJECTION INTRAVENOUS at 08:10

## 2021-01-10 RX ADMIN — SODIUM CHLORIDE, PRESERVATIVE FREE SCH ML: 5 INJECTION INTRAVENOUS at 08:12

## 2021-01-10 RX ADMIN — SODIUM CHLORIDE, PRESERVATIVE FREE SCH ML: 5 INJECTION INTRAVENOUS at 20:03

## 2021-01-10 RX ADMIN — INSULIN LISPRO SCH UNITS: 100 INJECTION, SOLUTION INTRAVENOUS; SUBCUTANEOUS at 20:11

## 2021-01-10 RX ADMIN — INSULIN LISPRO SCH UNITS: 100 INJECTION, SOLUTION INTRAVENOUS; SUBCUTANEOUS at 08:12

## 2021-01-10 RX ADMIN — INSULIN LISPRO SCH UNITS: 100 INJECTION, SOLUTION INTRAVENOUS; SUBCUTANEOUS at 11:11

## 2021-01-10 RX ADMIN — INSULIN LISPRO SCH UNITS: 100 INJECTION, SOLUTION INTRAVENOUS; SUBCUTANEOUS at 16:20

## 2021-01-10 RX ADMIN — TEMAZEPAM PRN MG: 15 CAPSULE ORAL at 20:58

## 2021-01-11 VITALS — DIASTOLIC BLOOD PRESSURE: 77 MMHG | SYSTOLIC BLOOD PRESSURE: 113 MMHG

## 2021-01-11 VITALS — SYSTOLIC BLOOD PRESSURE: 110 MMHG | DIASTOLIC BLOOD PRESSURE: 73 MMHG

## 2021-01-11 VITALS — SYSTOLIC BLOOD PRESSURE: 121 MMHG | DIASTOLIC BLOOD PRESSURE: 80 MMHG

## 2021-01-11 VITALS — SYSTOLIC BLOOD PRESSURE: 97 MMHG | DIASTOLIC BLOOD PRESSURE: 65 MMHG

## 2021-01-11 LAB
ANION GAP SERPL CALC-SCNC: 4 MMOL/L (ref 5–15)
BASOPHILS # BLD AUTO: 0 X10^3/UL (ref 0–0.1)
BASOPHILS NFR BLD AUTO: 1 % (ref 0–1)
CALCIUM SERPL-MCNC: 8.7 MG/DL (ref 8.5–10.1)
CHLORIDE SERPL-SCNC: 103 MMOL/L (ref 98–107)
CREAT SERPL-MCNC: 0.81 MG/DL (ref 0.7–1.3)
EOSINOPHIL # BLD AUTO: 0.1 X10^3/UL (ref 0–0.4)
EOSINOPHIL NFR BLD AUTO: 1 % (ref 1–7)
ERYTHROCYTE [DISTWIDTH] IN BLOOD BY AUTOMATED COUNT: 12.6 % (ref 9.4–14.8)
LYMPHOCYTES # BLD AUTO: 1.6 X10^3/UL (ref 1–3.4)
LYMPHOCYTES NFR BLD AUTO: 38 % (ref 22–44)
MCH RBC QN AUTO: 32.5 PG (ref 27.5–34.5)
MCHC RBC AUTO-ENTMCNC: 35.8 G/DL (ref 33.2–36.2)
MD: (no result)
MONOCYTES # BLD AUTO: 0.5 X10^3/UL (ref 0.2–0.8)
MONOCYTES NFR BLD AUTO: 12 % (ref 2–9)
NEUTROPHILS # BLD AUTO: 2.1 X10^3/UL (ref 1.8–6.8)
NEUTROPHILS NFR BLD AUTO: 48 % (ref 42–75)
PLATELET # BLD AUTO: 61 X10^3/UL (ref 130–400)
PMV BLD AUTO: 10.8 FL (ref 7.4–10.4)
RBC # BLD AUTO: 4.05 X10^6/UL (ref 4.38–5.82)

## 2021-01-11 RX ADMIN — INSULIN LISPRO SCH UNITS: 100 INJECTION, SOLUTION INTRAVENOUS; SUBCUTANEOUS at 21:56

## 2021-01-11 RX ADMIN — INSULIN LISPRO SCH UNITS: 100 INJECTION, SOLUTION INTRAVENOUS; SUBCUTANEOUS at 10:56

## 2021-01-11 RX ADMIN — Medication SCH MG: at 08:14

## 2021-01-11 RX ADMIN — SODIUM CHLORIDE, PRESERVATIVE FREE SCH ML: 5 INJECTION INTRAVENOUS at 08:31

## 2021-01-11 RX ADMIN — INSULIN GLARGINE SCH UNITS: 100 INJECTION, SOLUTION SUBCUTANEOUS at 21:55

## 2021-01-11 RX ADMIN — INSULIN LISPRO SCH UNITS: 100 INJECTION, SOLUTION INTRAVENOUS; SUBCUTANEOUS at 18:01

## 2021-01-11 RX ADMIN — INSULIN GLARGINE SCH UNITS: 100 INJECTION, SOLUTION SUBCUTANEOUS at 08:17

## 2021-01-11 RX ADMIN — ESOMEPRAZOLE SODIUM SCH MG: 40 INJECTION INTRAVENOUS at 08:31

## 2021-01-11 RX ADMIN — INSULIN LISPRO SCH UNITS: 100 INJECTION, SOLUTION INTRAVENOUS; SUBCUTANEOUS at 08:15

## 2021-01-11 RX ADMIN — TEMAZEPAM PRN MG: 15 CAPSULE ORAL at 22:05

## 2021-01-11 RX ADMIN — PHENOBARBITAL SCH MG: 20 ELIXIR ORAL at 22:00

## 2021-01-11 RX ADMIN — SODIUM CHLORIDE, PRESERVATIVE FREE SCH ML: 5 INJECTION INTRAVENOUS at 20:22

## 2021-01-11 RX ADMIN — PHENOBARBITAL SCH MG: 20 ELIXIR ORAL at 10:57

## 2021-01-12 VITALS — DIASTOLIC BLOOD PRESSURE: 71 MMHG | SYSTOLIC BLOOD PRESSURE: 104 MMHG

## 2021-01-12 VITALS — SYSTOLIC BLOOD PRESSURE: 99 MMHG | DIASTOLIC BLOOD PRESSURE: 66 MMHG

## 2021-01-12 VITALS — DIASTOLIC BLOOD PRESSURE: 83 MMHG | SYSTOLIC BLOOD PRESSURE: 118 MMHG

## 2021-01-12 LAB
ALBUMIN SERPL-MCNC: 3.1 G/DL (ref 3.4–5)
ALP SERPL-CCNC: 82 U/L (ref 45–117)
ALT SERPL-CCNC: 94 U/L (ref 12–78)
ANION GAP SERPL CALC-SCNC: 5 MMOL/L (ref 5–15)
BASOPHILS # BLD AUTO: 0 X10^3/UL (ref 0–0.1)
BASOPHILS NFR BLD AUTO: 1 % (ref 0–1)
BILIRUB SERPL-MCNC: 0.4 MG/DL (ref 0.2–1)
CALCIUM SERPL-MCNC: 8.7 MG/DL (ref 8.5–10.1)
CHLORIDE SERPL-SCNC: 103 MMOL/L (ref 98–107)
CREAT SERPL-MCNC: 0.71 MG/DL (ref 0.7–1.3)
EOSINOPHIL # BLD AUTO: 0.1 X10^3/UL (ref 0–0.4)
EOSINOPHIL NFR BLD AUTO: 1 % (ref 1–7)
ERYTHROCYTE [DISTWIDTH] IN BLOOD BY AUTOMATED COUNT: 12.7 % (ref 9.4–14.8)
LYMPHOCYTES # BLD AUTO: 2.1 X10^3/UL (ref 1–3.4)
LYMPHOCYTES NFR BLD AUTO: 39 % (ref 22–44)
MCH RBC QN AUTO: 32.7 PG (ref 27.5–34.5)
MCHC RBC AUTO-ENTMCNC: 35.2 G/DL (ref 33.2–36.2)
MD: (no result)
MONOCYTES # BLD AUTO: 0.8 X10^3/UL (ref 0.2–0.8)
MONOCYTES NFR BLD AUTO: 15 % (ref 2–9)
NEUTROPHILS # BLD AUTO: 2.5 X10^3/UL (ref 1.8–6.8)
NEUTROPHILS NFR BLD AUTO: 45 % (ref 42–75)
PLATELET # BLD AUTO: 81 X10^3/UL (ref 130–400)
PMV BLD AUTO: 10.5 FL (ref 7.4–10.4)
PROT SERPL-MCNC: 6.2 G/DL (ref 6.4–8.2)
RBC # BLD AUTO: 3.93 X10^6/UL (ref 4.38–5.82)

## 2021-01-12 RX ADMIN — INSULIN LISPRO SCH UNITS: 100 INJECTION, SOLUTION INTRAVENOUS; SUBCUTANEOUS at 08:21

## 2021-01-12 RX ADMIN — SODIUM CHLORIDE, PRESERVATIVE FREE SCH ML: 5 INJECTION INTRAVENOUS at 08:22

## 2021-01-12 RX ADMIN — INSULIN GLARGINE SCH UNITS: 100 INJECTION, SOLUTION SUBCUTANEOUS at 08:21

## 2021-01-12 RX ADMIN — PHENOBARBITAL SCH MG: 20 ELIXIR ORAL at 08:19

## 2021-01-12 RX ADMIN — ESOMEPRAZOLE SODIUM SCH MG: 40 INJECTION INTRAVENOUS at 08:19

## 2021-01-12 RX ADMIN — Medication SCH MG: at 08:20

## 2021-01-12 RX ADMIN — INSULIN LISPRO SCH UNITS: 100 INJECTION, SOLUTION INTRAVENOUS; SUBCUTANEOUS at 08:20

## 2021-01-12 RX ADMIN — INSULIN LISPRO SCH UNITS: 100 INJECTION, SOLUTION INTRAVENOUS; SUBCUTANEOUS at 11:00

## 2021-01-12 RX ADMIN — INSULIN LISPRO SCH UNITS: 100 INJECTION, SOLUTION INTRAVENOUS; SUBCUTANEOUS at 11:40
